# Patient Record
Sex: FEMALE | Race: WHITE | NOT HISPANIC OR LATINO | Employment: OTHER | URBAN - METROPOLITAN AREA
[De-identification: names, ages, dates, MRNs, and addresses within clinical notes are randomized per-mention and may not be internally consistent; named-entity substitution may affect disease eponyms.]

---

## 2017-01-09 DIAGNOSIS — E05.90 HYPERTHYROIDISM: ICD-10-CM

## 2017-01-09 DIAGNOSIS — E05.00 GRAVES DISEASE: Primary | ICD-10-CM

## 2017-01-10 ENCOUNTER — TELEPHONE (OUTPATIENT)
Dept: INTERNAL MEDICINE | Facility: CLINIC | Age: 57
End: 2017-01-10

## 2017-01-10 LAB
T4 FREE SERPL-MCNC: 1.01 NG/DL (ref 0.89–1.76)
TSH SERPL DL<=0.05 MIU/L-ACNC: 3.08 MIU/ML (ref 0.35–5.35)

## 2017-01-10 PROCEDURE — 84443 ASSAY THYROID STIM HORMONE: CPT | Performed by: INTERNAL MEDICINE

## 2017-01-10 PROCEDURE — 84439 ASSAY OF FREE THYROXINE: CPT | Performed by: INTERNAL MEDICINE

## 2017-01-10 PROCEDURE — 84481 FREE ASSAY (FT-3): CPT | Performed by: INTERNAL MEDICINE

## 2017-01-10 NOTE — TELEPHONE ENCOUNTER
----- Message from Karie Whitney MD sent at 1/9/2017  4:53 PM EST -----  Orders created.  Thanks, [facility]  ----- Message -----     From: Felicity Walsh MA     Sent: 1/9/2017  11:26 AM       To: Karie Whitney MD        ----- Message -----     From: Nolvia Mccollum     Sent: 1/9/2017   9:48 AM       To: Felicity Walsh MA    PT IS WANTING TO COME IN TOMORROW AND HAVE LABS DONE. PLEASE PLACE ORDER IN SYS

## 2017-01-11 LAB — T3FREE SERPL-MCNC: 3.3 PG/ML (ref 2–4.4)

## 2017-01-16 ENCOUNTER — TELEPHONE (OUTPATIENT)
Dept: INTERNAL MEDICINE | Facility: CLINIC | Age: 57
End: 2017-01-16

## 2017-01-16 NOTE — TELEPHONE ENCOUNTER
----- Message from Isa Montemayor sent at 1/16/2017  8:47 AM EST -----  THE PATIENT WAS CALLING IN REGARDS TO HER LAB WORK  AND WOULD LIKE FOR YOU TO GIVE HER A CALL SHE HAD SOME QUESTIONS CONCERNING THE INFORMATION THAT SHE HAS RECEIVED. THE PATIENT'S CALL BACK NUMBER [phone]

## 2017-01-16 NOTE — TELEPHONE ENCOUNTER
8 weeks is fine.  Thanks, [facility]      Called and spoke with pt, informed her of lab results and instructions per lab letters; instructing pt to reduce methimazole to 1 pill daily and recheck labs in 8 weeks, pt is scheduled to see Dr. Whitney tomorrow 01/17/17, pt was wondering if she has to come in tomorrow to be seen or can she come in and see Dr. Whitney in 8 weeks after she gets her labs rechecked again, Dr. Whitney pls advise?

## 2017-01-16 NOTE — TELEPHONE ENCOUNTER
Next Tuesday at 8:15 am.  Thanks, [facility]        Called and informed pt, per pt she can come in any time on Tuesdays, she can't come in on Thursday, any other day morning appts are best for her because her work starts at 9. Dr. Whitney you are completely booked up 8 weeks from now, pls advise on when we can work her in?

## 2017-01-17 NOTE — TELEPHONE ENCOUNTER
Yes that is good.  Thanks, [facility]          Dr. Whitney you wanted to see pt 8 weeks from now after she gets her labs done, can we use a Tuesday  8:15 appt slot 7-8 weeks from now?

## 2017-01-18 NOTE — TELEPHONE ENCOUNTER
Called and informed pt, per pt she can come in on 03/14/17 at 8:15 am, Tri can you pls schedule pt at that appt slot with Dr. Whitney, thanks!

## 2017-03-07 ENCOUNTER — TELEPHONE (OUTPATIENT)
Dept: ENDOCRINOLOGY | Facility: CLINIC | Age: 57
End: 2017-03-07

## 2017-03-07 DIAGNOSIS — E05.90 HYPERTHYROIDISM: Primary | ICD-10-CM

## 2017-03-07 LAB
T4 FREE SERPL-MCNC: 1.01 NG/DL (ref 0.89–1.76)
TSH SERPL DL<=0.05 MIU/L-ACNC: 3.62 MIU/ML (ref 0.35–5.35)

## 2017-03-07 PROCEDURE — 84443 ASSAY THYROID STIM HORMONE: CPT | Performed by: INTERNAL MEDICINE

## 2017-03-07 PROCEDURE — 84481 FREE ASSAY (FT-3): CPT | Performed by: INTERNAL MEDICINE

## 2017-03-07 PROCEDURE — 84439 ASSAY OF FREE THYROXINE: CPT | Performed by: INTERNAL MEDICINE

## 2017-03-08 LAB — T3FREE SERPL-MCNC: 2.9 PG/ML (ref 2–4.4)

## 2017-03-14 ENCOUNTER — OFFICE VISIT (OUTPATIENT)
Dept: ENDOCRINOLOGY | Facility: CLINIC | Age: 57
End: 2017-03-14

## 2017-03-14 VITALS
OXYGEN SATURATION: 98 % | DIASTOLIC BLOOD PRESSURE: 60 MMHG | WEIGHT: 140 LBS | HEIGHT: 66 IN | SYSTOLIC BLOOD PRESSURE: 118 MMHG | BODY MASS INDEX: 22.5 KG/M2 | HEART RATE: 78 BPM

## 2017-03-14 DIAGNOSIS — E05.90 HYPERTHYROIDISM: Primary | ICD-10-CM

## 2017-03-14 DIAGNOSIS — E05.00 GRAVES DISEASE: ICD-10-CM

## 2017-03-14 PROCEDURE — 99213 OFFICE O/P EST LOW 20 MIN: CPT | Performed by: INTERNAL MEDICINE

## 2017-03-14 RX ORDER — METHIMAZOLE 5 MG/1
5 TABLET ORAL DAILY
Qty: 30 TABLET | Refills: 3 | Status: SHIPPED | OUTPATIENT
Start: 2017-03-14 | End: 2017-03-14 | Stop reason: SDUPTHER

## 2017-03-14 RX ORDER — METHIMAZOLE 5 MG/1
TABLET ORAL
Qty: 30 TABLET | Refills: 3
Start: 2017-03-14 | End: 2017-05-22 | Stop reason: SDUPTHER

## 2017-03-14 RX ORDER — POLYETHYLENE GLYCOL 3350 17 G/17G
17 POWDER, FOR SOLUTION ORAL DAILY
COMMUNITY

## 2017-03-14 NOTE — PROGRESS NOTES
Falguni Alonzo 56 y.o.  CC:Follow-up and Hyperthyroidism    [place]: Follow-up and Hyperthyroidism  is doing well overall  No new c/o   Is taking 1 pill daily     Allergies   Allergen Reactions   • Erythromycin Hives and Rash       Current Outpatient Prescriptions:   •  Biotin 5000 MCG capsule, Take  by mouth., Disp: , Rfl:   •  Calcium Carbonate-Vitamin D (CALCIUM 500+D HIGH POTENCY) 500-400 MG-UNIT per tablet, Take 1 tablet by mouth., Disp: , Rfl:   •  Cholecalciferol (VITAMIN D) 1000 UNITS tablet, Take 1 tablet by mouth daily., Disp: , Rfl:   •  minoxidil (ROGAINE) 2 % external solution, Apply  topically 2 (two) times a day., Disp: , Rfl:   •  Multiple Vitamins-Minerals (CENTRUM PO), Take 1 tablet by mouth daily., Disp: , Rfl:   •  polyethylene glycol (MIRALAX) packet, Take 17 g by mouth Daily., Disp: , Rfl:   •  PREVIDENT 5000 SENSITIVE 1.1-5 % paste, , Disp: , Rfl:   •  docusate sodium (COLACE) 250 MG capsule, Take 250 mg by mouth 2 (two) times a day., Disp: , Rfl:   •  methimazole (TAPAZOLE) 5 MG tablet, TAKE 1 TABLET QOD ALTERNATING WITH 1 AND 1/2 TABLET QOD (Patient taking differently: 5 mg. Take one tablet daily), Disp: 45 tablet, Rfl: 5  Patient Active Problem List    Diagnosis   • WINDY (stress urinary incontinence, female) [N39.3]   • Rectocele [N81.6]   • Menopause [Z78.0]   • Graves disease [E05.00]   • Fibrocystic breast changes, bilateral [N60.11, N60.12]   • Family history of breast cancer in mother [Z80.3]   • Chronic constipation [K59.09]   • Anemia [D64.9]     Overview Note:     Impression: 09/01/2015 - repeat cbc; Description: childhood     • Loss of hair [L65.9]     Overview Note:     Impression: 09/01/2015 - likely due to stress related to thyroid  will continue to monitor;      • Hyperthyroidism [E05.90]     Overview Note:     Impression: 09/01/2015 - check tfts  check cbc, cmp  hives in interim on methimazole  single day of fever  discussed options for treatment  Impression: 06/22/2015 - check  tfts, tsi, thyroid antibodies  ddx of thyroid overactivity was discussed along with options for treatment   f/u 8-10 weeks   patient counselled about risk of thyroid overactivity, symptoms related to this  also discussed risks and benefits of each treatment option.;        Review of Systems   Constitutional: Negative for activity change, appetite change, chills, diaphoresis, fatigue, fever and unexpected weight change.   HENT: Negative for congestion, dental problem, drooling, ear discharge, ear pain, facial swelling, hearing loss, mouth sores, nosebleeds, postnasal drip, rhinorrhea, sinus pressure, sneezing, sore throat, tinnitus, trouble swallowing and voice change.    Eyes: Negative for photophobia, pain, discharge, redness, itching and visual disturbance.   Respiratory: Negative for apnea, cough, choking, chest tightness, shortness of breath, wheezing and stridor.    Cardiovascular: Negative for chest pain, palpitations and leg swelling.   Gastrointestinal: Negative for abdominal distention, abdominal pain, anal bleeding, blood in stool, constipation, diarrhea, nausea, rectal pain and vomiting.   Endocrine: Negative for cold intolerance, heat intolerance, polydipsia, polyphagia and polyuria.   Genitourinary: Negative for decreased urine volume, difficulty urinating, dysuria, enuresis, flank pain, frequency, genital sores, hematuria and urgency.   Musculoskeletal: Negative for arthralgias, back pain, gait problem, joint swelling, myalgias, neck pain and neck stiffness.   Skin: Negative for color change, pallor, rash and wound.   Allergic/Immunologic: Negative for environmental allergies, food allergies and immunocompromised state.   Neurological: Negative for dizziness, tremors, seizures, syncope, facial asymmetry, speech difficulty, weakness, light-headedness, numbness and headaches.   Hematological: Negative for adenopathy. Does not bruise/bleed easily.   Psychiatric/Behavioral: Negative for agitation,  "behavioral problems, confusion, decreased concentration, dysphoric mood, hallucinations, self-injury, sleep disturbance and suicidal ideas. The patient is not nervous/anxious and is not hyperactive.      Social History     Social History   • Marital status:      Spouse name: N/A   • Number of children: N/A   • Years of education: N/A     Occupational History   • Not on file.     Social History Main Topics   • Smoking status: Never Smoker   • Smokeless tobacco: Not on file   • Alcohol use No   • Drug use: No   • Sexual activity: Yes     Partners: Male     Birth control/ protection: Surgical, Post-menopausal     Other Topics Concern   • Not on file     Social History Narrative     Family History   Problem Relation Age of Onset   • Arrhythmia Mother    • Cancer Mother    • Heart attack Father    • Diabetes type II Father    • Thyroid disease Father      Visit Vitals   • /60   • Pulse 78   • Ht 66\" (167.6 cm)   • Wt 140 lb (63.5 kg)   • LMP  (LMP Unknown)   • SpO2 98%   • BMI 22.6 kg/m2     Physical Exam   Constitutional: She is oriented to person, place, and time. She appears well-developed and well-nourished.   HENT:   Head: Normocephalic and atraumatic.   Nose: Nose normal.   Mouth/Throat: Oropharynx is clear and moist.   Eyes: Conjunctivae, EOM and lids are normal. Pupils are equal, round, and reactive to light.   Neck: Trachea normal and normal range of motion. Neck supple. Carotid bruit is not present. No tracheal deviation present. No thyroid mass and no thyromegaly present.   Cardiovascular: Normal rate, regular rhythm, normal heart sounds and intact distal pulses.  Exam reveals no gallop and no friction rub.    No murmur heard.  Pulmonary/Chest: Effort normal and breath sounds normal. No respiratory distress. She has no wheezes.   Musculoskeletal: Normal range of motion. She exhibits no edema or deformity.   Lymphadenopathy:     She has no cervical adenopathy.   Neurological: She is alert and " oriented to person, place, and time. She has normal reflexes. She displays normal reflexes. No cranial nerve deficit.   Skin: Skin is warm and dry. No rash noted. No cyanosis or erythema. Nails show no clubbing.   Psychiatric: She has a normal mood and affect. Her speech is normal and behavior is normal. Judgment and thought content normal. Cognition and memory are normal.   Nursing note and vitals reviewed.    Results for orders placed or performed in visit on 03/07/17   T4, Free   Result Value Ref Range    Free T4 1.01 0.89 - 1.76 ng/dL   TSH   Result Value Ref Range    TSH 3.619 0.350 - 5.350 mIU/mL   T3, Free   Result Value Ref Range    T3, Free 2.9 2.0 - 4.4 pg/mL     Problem List Items Addressed This Visit        Endocrine    Hyperthyroidism - Primary     Reviewed tfts with her   Reduce tapazole to 2.5 mg daily   Recheck lab work in 8 weeks          Relevant Medications    TAPAZOLE 5 MG tablet    Graves disease     Stable          Relevant Medications    TAPAZOLE 5 MG tablet        Return in about 6 months (around 9/14/2017) for Recheck 30 min .    Felicity Walsh MA

## 2017-05-15 ENCOUNTER — TELEPHONE (OUTPATIENT)
Dept: INTERNAL MEDICINE | Facility: CLINIC | Age: 57
End: 2017-05-15

## 2017-05-15 DIAGNOSIS — E05.90 HYPERTHYROIDISM: Primary | ICD-10-CM

## 2017-05-16 ENCOUNTER — TELEPHONE (OUTPATIENT)
Dept: INTERNAL MEDICINE | Facility: CLINIC | Age: 57
End: 2017-05-16

## 2017-05-16 LAB
ALBUMIN SERPL-MCNC: 4.3 G/DL (ref 3.2–4.8)
ALBUMIN/GLOB SERPL: 1.9 G/DL (ref 1.5–2.5)
ALP SERPL-CCNC: 77 U/L (ref 25–100)
ALT SERPL W P-5'-P-CCNC: 24 U/L (ref 7–40)
ANION GAP SERPL CALCULATED.3IONS-SCNC: 3 MMOL/L (ref 3–11)
AST SERPL-CCNC: 29 U/L (ref 0–33)
BASOPHILS # BLD AUTO: 0.01 10*3/MM3 (ref 0–0.2)
BASOPHILS NFR BLD AUTO: 0.2 % (ref 0–1)
BILIRUB SERPL-MCNC: 0.4 MG/DL (ref 0.3–1.2)
BUN BLD-MCNC: 13 MG/DL (ref 9–23)
BUN/CREAT SERPL: 21.7 (ref 7–25)
CALCIUM SPEC-SCNC: 10.2 MG/DL (ref 8.7–10.4)
CHLORIDE SERPL-SCNC: 100 MMOL/L (ref 99–109)
CO2 SERPL-SCNC: 35 MMOL/L (ref 20–31)
CREAT BLD-MCNC: 0.6 MG/DL (ref 0.6–1.3)
DEPRECATED RDW RBC AUTO: 43 FL (ref 37–54)
EOSINOPHIL # BLD AUTO: 0.17 10*3/MM3 (ref 0.1–0.3)
EOSINOPHIL NFR BLD AUTO: 3.6 % (ref 0–3)
ERYTHROCYTE [DISTWIDTH] IN BLOOD BY AUTOMATED COUNT: 12.4 % (ref 11.3–14.5)
GFR SERPL CREATININE-BSD FRML MDRD: 103 ML/MIN/1.73
GLOBULIN UR ELPH-MCNC: 2.3 GM/DL
GLUCOSE BLD-MCNC: 100 MG/DL (ref 70–100)
HCT VFR BLD AUTO: 39.8 % (ref 34.5–44)
HGB BLD-MCNC: 13.3 G/DL (ref 11.5–15.5)
IMM GRANULOCYTES # BLD: 0.01 10*3/MM3 (ref 0–0.03)
IMM GRANULOCYTES NFR BLD: 0.2 % (ref 0–0.6)
LYMPHOCYTES # BLD AUTO: 1.88 10*3/MM3 (ref 0.6–4.8)
LYMPHOCYTES NFR BLD AUTO: 39.6 % (ref 24–44)
MCH RBC QN AUTO: 31.9 PG (ref 27–31)
MCHC RBC AUTO-ENTMCNC: 33.4 G/DL (ref 32–36)
MCV RBC AUTO: 95.4 FL (ref 80–99)
MONOCYTES # BLD AUTO: 0.55 10*3/MM3 (ref 0–1)
MONOCYTES NFR BLD AUTO: 11.6 % (ref 0–12)
NEUTROPHILS # BLD AUTO: 2.13 10*3/MM3 (ref 1.5–8.3)
NEUTROPHILS NFR BLD AUTO: 44.8 % (ref 41–71)
PLATELET # BLD AUTO: 246 10*3/MM3 (ref 150–450)
PMV BLD AUTO: 9.7 FL (ref 6–12)
POTASSIUM BLD-SCNC: 4.1 MMOL/L (ref 3.5–5.5)
PROT SERPL-MCNC: 6.6 G/DL (ref 5.7–8.2)
RBC # BLD AUTO: 4.17 10*6/MM3 (ref 3.89–5.14)
SODIUM BLD-SCNC: 138 MMOL/L (ref 132–146)
T4 FREE SERPL-MCNC: 1.04 NG/DL (ref 0.89–1.76)
TSH SERPL DL<=0.05 MIU/L-ACNC: 3.29 MIU/ML (ref 0.35–5.35)
WBC NRBC COR # BLD: 4.75 10*3/MM3 (ref 3.5–10.8)

## 2017-05-16 PROCEDURE — 85025 COMPLETE CBC W/AUTO DIFF WBC: CPT | Performed by: INTERNAL MEDICINE

## 2017-05-16 PROCEDURE — 84439 ASSAY OF FREE THYROXINE: CPT | Performed by: INTERNAL MEDICINE

## 2017-05-16 PROCEDURE — 84481 FREE ASSAY (FT-3): CPT | Performed by: INTERNAL MEDICINE

## 2017-05-16 PROCEDURE — 84443 ASSAY THYROID STIM HORMONE: CPT | Performed by: INTERNAL MEDICINE

## 2017-05-16 PROCEDURE — 80053 COMPREHEN METABOLIC PANEL: CPT | Performed by: INTERNAL MEDICINE

## 2017-05-17 LAB — T3FREE SERPL-MCNC: 2.9 PG/ML (ref 2–4.4)

## 2017-05-22 ENCOUNTER — TELEPHONE (OUTPATIENT)
Dept: INTERNAL MEDICINE | Facility: CLINIC | Age: 57
End: 2017-05-22

## 2017-05-22 RX ORDER — METHIMAZOLE 5 MG/1
TABLET ORAL
Qty: 30 TABLET | Refills: 3
Start: 2017-05-22 | End: 2018-08-14 | Stop reason: SDUPTHER

## 2017-07-24 ENCOUNTER — TELEPHONE (OUTPATIENT)
Dept: INTERNAL MEDICINE | Facility: CLINIC | Age: 57
End: 2017-07-24

## 2017-07-24 ENCOUNTER — TELEPHONE (OUTPATIENT)
Dept: ENDOCRINOLOGY | Facility: CLINIC | Age: 57
End: 2017-07-24

## 2017-07-24 DIAGNOSIS — E05.00 GRAVES' DISEASE: Primary | ICD-10-CM

## 2017-07-24 NOTE — TELEPHONE ENCOUNTER
Patient advised lab order has been created and she can come in tomorrow to have labs drawn  Pt voiced understanding

## 2017-07-24 NOTE — TELEPHONE ENCOUNTER
MS NIVIA CALLED REQUESTING LAB ORDERS FOR THYROID, SHE STATES THAT SHE WILL BE OFF WORK ON TUES 7/25 AND WOULD LIKE TO COME IN TO HAVE THESE DRAWN.

## 2017-07-25 LAB
ALBUMIN SERPL-MCNC: 4.2 G/DL (ref 3.2–4.8)
ALBUMIN/GLOB SERPL: 1.9 G/DL (ref 1.5–2.5)
ALP SERPL-CCNC: 77 U/L (ref 25–100)
ALT SERPL W P-5'-P-CCNC: 24 U/L (ref 7–40)
ANION GAP SERPL CALCULATED.3IONS-SCNC: 4 MMOL/L (ref 3–11)
AST SERPL-CCNC: 25 U/L (ref 0–33)
BASOPHILS # BLD AUTO: 0.02 10*3/MM3 (ref 0–0.2)
BASOPHILS NFR BLD AUTO: 0.4 % (ref 0–1)
BILIRUB SERPL-MCNC: 0.4 MG/DL (ref 0.3–1.2)
BUN BLD-MCNC: 17 MG/DL (ref 9–23)
BUN/CREAT SERPL: 24.3 (ref 7–25)
CALCIUM SPEC-SCNC: 9.8 MG/DL (ref 8.7–10.4)
CHLORIDE SERPL-SCNC: 103 MMOL/L (ref 99–109)
CO2 SERPL-SCNC: 31 MMOL/L (ref 20–31)
CREAT BLD-MCNC: 0.7 MG/DL (ref 0.6–1.3)
DEPRECATED RDW RBC AUTO: 42.2 FL (ref 37–54)
EOSINOPHIL # BLD AUTO: 0.13 10*3/MM3 (ref 0–0.3)
EOSINOPHIL NFR BLD AUTO: 2.9 % (ref 0–3)
ERYTHROCYTE [DISTWIDTH] IN BLOOD BY AUTOMATED COUNT: 12.3 % (ref 11.3–14.5)
GFR SERPL CREATININE-BSD FRML MDRD: 86 ML/MIN/1.73
GLOBULIN UR ELPH-MCNC: 2.2 GM/DL
GLUCOSE BLD-MCNC: 96 MG/DL (ref 70–100)
HCT VFR BLD AUTO: 38.6 % (ref 34.5–44)
HGB BLD-MCNC: 12.8 G/DL (ref 11.5–15.5)
IMM GRANULOCYTES # BLD: 0 10*3/MM3 (ref 0–0.03)
IMM GRANULOCYTES NFR BLD: 0 % (ref 0–0.6)
LYMPHOCYTES # BLD AUTO: 1.77 10*3/MM3 (ref 0.6–4.8)
LYMPHOCYTES NFR BLD AUTO: 39 % (ref 24–44)
MCH RBC QN AUTO: 31.1 PG (ref 27–31)
MCHC RBC AUTO-ENTMCNC: 33.2 G/DL (ref 32–36)
MCV RBC AUTO: 93.7 FL (ref 80–99)
MONOCYTES # BLD AUTO: 0.51 10*3/MM3 (ref 0–1)
MONOCYTES NFR BLD AUTO: 11.2 % (ref 0–12)
NEUTROPHILS # BLD AUTO: 2.11 10*3/MM3 (ref 1.5–8.3)
NEUTROPHILS NFR BLD AUTO: 46.5 % (ref 41–71)
PLATELET # BLD AUTO: 261 10*3/MM3 (ref 150–450)
PMV BLD AUTO: 9.6 FL (ref 6–12)
POTASSIUM BLD-SCNC: 4.2 MMOL/L (ref 3.5–5.5)
PROT SERPL-MCNC: 6.4 G/DL (ref 5.7–8.2)
RBC # BLD AUTO: 4.12 10*6/MM3 (ref 3.89–5.14)
SODIUM BLD-SCNC: 138 MMOL/L (ref 132–146)
T4 FREE SERPL-MCNC: 1.01 NG/DL (ref 0.89–1.76)
TSH SERPL DL<=0.05 MIU/L-ACNC: 2.02 MIU/ML (ref 0.35–5.35)
WBC NRBC COR # BLD: 4.54 10*3/MM3 (ref 3.5–10.8)

## 2017-07-25 PROCEDURE — 84439 ASSAY OF FREE THYROXINE: CPT | Performed by: INTERNAL MEDICINE

## 2017-07-25 PROCEDURE — 85025 COMPLETE CBC W/AUTO DIFF WBC: CPT | Performed by: INTERNAL MEDICINE

## 2017-07-25 PROCEDURE — 80053 COMPREHEN METABOLIC PANEL: CPT | Performed by: INTERNAL MEDICINE

## 2017-07-25 PROCEDURE — 84443 ASSAY THYROID STIM HORMONE: CPT | Performed by: INTERNAL MEDICINE

## 2017-07-31 ENCOUNTER — TELEPHONE (OUTPATIENT)
Dept: INTERNAL MEDICINE | Facility: CLINIC | Age: 57
End: 2017-07-31

## 2017-07-31 NOTE — TELEPHONE ENCOUNTER
PATIENT RECEIVED HER LAB WORK IN THE MAIL AND SHE HAS A COUPLE QUESTIONS ON WHAT SHE RECEIVED AND WOULD LIKE TO GET A CALL BACK -[phone]

## 2017-07-31 NOTE — TELEPHONE ENCOUNTER
Patient is asking if a free T3 was done on her labs   She was advised it was not recently done and if she wants it done next time to remind us when she calls for the order to be created  She voiced understanding and will remind us next time

## 2017-09-18 ENCOUNTER — TELEPHONE (OUTPATIENT)
Dept: INTERNAL MEDICINE | Facility: CLINIC | Age: 57
End: 2017-09-18

## 2017-09-18 NOTE — TELEPHONE ENCOUNTER
Patient was advised she does need an OV every 6 months for Dr Whitney to continue to monitor and order labs for her thyroid conditions  Pt voiced understanding

## 2017-09-18 NOTE — TELEPHONE ENCOUNTER
PATIENT HAS AN APPOINTMENT COMING UP FOR A 6 MONTH FOLLOW UP. SHE STATES SHE RECENTLY HAD BLOOD WORK AND IS NOT SURE IF SHE REALLY NEEDS THIS APPOINTMENT. SHE WOULD LIKE A CALL BACK ON IF SHE TRULY NEEDS TO COME IN FOR THIS APPOINTMENT OR IS THIS SOMETHING SHE CAN CANCEL. YOU CAN REACH HER BACK AT [phone]

## 2017-10-08 RX ORDER — METHIMAZOLE 5 MG/1
TABLET ORAL
Qty: 30 TABLET | Refills: 0 | Status: SHIPPED | OUTPATIENT
Start: 2017-10-08 | End: 2017-11-06 | Stop reason: SDUPTHER

## 2017-10-30 ENCOUNTER — TELEPHONE (OUTPATIENT)
Dept: INTERNAL MEDICINE | Facility: CLINIC | Age: 57
End: 2017-10-30

## 2017-10-30 DIAGNOSIS — E05.90 HYPERTHYROIDISM: Primary | ICD-10-CM

## 2017-10-30 NOTE — TELEPHONE ENCOUNTER
Order created.  Thanks, [facility]      Please create lab order

## 2017-10-30 NOTE — TELEPHONE ENCOUNTER
PHOEBE,    MS NIVIA CALLED TO REQUEST T3, T4 AND  TSH LAB ORDERS. SHE STATES SHE SHOULD HAVE HAD THIS DONE SEVERAL MONTHS AGO BUT MISSED GETTING THESE DRAWN DUE TO BIRTH OF GRANDCHILD AND CARING FOR A PARENT AFTER A FALL. SHE WOULD LIKE TO BE ABLE TO COME IN THURS MORNING.

## 2017-11-01 LAB
T4 FREE SERPL-MCNC: 1.08 NG/DL (ref 0.89–1.76)
TSH SERPL DL<=0.05 MIU/L-ACNC: 2.65 MIU/ML (ref 0.35–5.35)

## 2017-11-01 PROCEDURE — 84439 ASSAY OF FREE THYROXINE: CPT | Performed by: INTERNAL MEDICINE

## 2017-11-01 PROCEDURE — 84443 ASSAY THYROID STIM HORMONE: CPT | Performed by: INTERNAL MEDICINE

## 2017-11-01 PROCEDURE — 84481 FREE ASSAY (FT-3): CPT | Performed by: INTERNAL MEDICINE

## 2017-11-02 LAB — T3FREE SERPL-MCNC: 3.3 PG/ML (ref 2–4.4)

## 2017-11-06 RX ORDER — METHIMAZOLE 5 MG/1
TABLET ORAL
Qty: 30 TABLET | Refills: 0 | Status: SHIPPED | OUTPATIENT
Start: 2017-11-06 | End: 2018-01-09 | Stop reason: SDUPTHER

## 2017-11-28 ENCOUNTER — OFFICE VISIT (OUTPATIENT)
Dept: OBSTETRICS AND GYNECOLOGY | Facility: CLINIC | Age: 57
End: 2017-11-28

## 2017-11-28 VITALS
SYSTOLIC BLOOD PRESSURE: 120 MMHG | HEIGHT: 66 IN | BODY MASS INDEX: 22.56 KG/M2 | DIASTOLIC BLOOD PRESSURE: 70 MMHG | WEIGHT: 140.4 LBS

## 2017-11-28 DIAGNOSIS — N60.12 FIBROCYSTIC BREAST CHANGES, BILATERAL: ICD-10-CM

## 2017-11-28 DIAGNOSIS — Z80.3 FAMILY HISTORY OF BREAST CANCER IN MOTHER: ICD-10-CM

## 2017-11-28 DIAGNOSIS — Z78.0 MENOPAUSE: Primary | ICD-10-CM

## 2017-11-28 DIAGNOSIS — N60.11 FIBROCYSTIC BREAST CHANGES, BILATERAL: ICD-10-CM

## 2017-11-28 PROCEDURE — 99396 PREV VISIT EST AGE 40-64: CPT | Performed by: OBSTETRICS & GYNECOLOGY

## 2017-11-28 NOTE — PROGRESS NOTES
Chief Complaint   Patient presents with   • Gynecologic Exam       Falguni Alonzo is a 57 y.o. year old  presenting to be seen for her annual exam.This patient has previously had an LAVH and an anterior-posterior repair.  She has occasional stress incontinence although it is not a constant problem.  She has known, stable, fibrocystic changes of the breast.  She has occasional hot flashes although she does not desire treatment.  Her mother had breast cancer.    SCREENING TESTS    Year 2012   Age                         PAP     Neg.                    HPV high risk                         Mammogram     benign benign                   NANCY score                         Breast MRI                         Lipids                         Vitamin D                         Colonoscopy                         DEXA  Frax (hip/any)                         Ovarian Screen                             She exercises regularly: yes.  She wears her seat belt: yes.  She has concerns about domestic violence: no.  She has noticed changes in height: no    GYN screening history:  · Last pap: was done on approximately 2016 and the result was: normal PAP.  · Last mammogram: was done on approximately 2017 and the result was: Birads II (Benign findings)..    No Additional Complaints Reported    The following portions of the patient's history were reviewed and updated as appropriate:vital signs and   She  does not have any pertinent problems on file.  She  has a past surgical history that includes Tonsillectomy; Tonsillectomy; Tubal ligation; and laparoscopic assisted vaginal hysterectomy, anterior and posterior vaginal repair.  Her family history includes Arrhythmia in her mother; Cancer in her mother; Diabetes type II in her father; Heart attack in her father; Thyroid disease in her father.  She  reports that she has  "never smoked. She has never used smokeless tobacco. She reports that she does not drink alcohol or use illicit drugs.  Current Outpatient Prescriptions   Medication Sig Dispense Refill   • Biotin 5000 MCG capsule Take  by mouth.     • Calcium Carbonate-Vitamin D (CALCIUM 500+D HIGH POTENCY) 500-400 MG-UNIT per tablet Take 1 tablet by mouth.     • Cholecalciferol (VITAMIN D) 1000 UNITS tablet Take 1 tablet by mouth daily.     • docusate sodium (COLACE) 250 MG capsule Take 250 mg by mouth 2 (two) times a day.     • minoxidil (ROGAINE) 2 % external solution Apply  topically 2 (two) times a day.     • Multiple Vitamins-Minerals (CENTRUM PO) Take 1 tablet by mouth daily.     • polyethylene glycol (MIRALAX) packet Take 17 g by mouth Daily.     • TAPAZOLE 5 MG tablet Take 1/2 pill daily a/w 3/4 pill QOD 30 tablet 3   • PREVIDENT 5000 SENSITIVE 1.1-5 % paste      • TAPAZOLE 5 MG tablet TAKE 1\2 TABLET ONE DAY AND ALTERNATE WITH 3\4 TABLET NEXT DAY 30 tablet 0     No current facility-administered medications for this visit.      She is allergic to erythromycin..    Review of Systems  A comprehensive review of systems was taken.  Constitutional: negative for fever, chills, activity change, appetite change, fatigue and unexpected weight change.  Respiratory: negative  Cardiovascular: negative  Gastrointestinal: negative  Genitourinary:occasional WINDY  Musculoskeletal:negative  Behavioral/Psych: negative       /70  Ht 66\" (167.6 cm)  Wt 140 lb 6.4 oz (63.7 kg)  LMP  (LMP Unknown)  BMI 22.66 kg/m2    Physical Exam    General:  alert; cooperative; well developed; well nourished   Skin:  No suspicious lesions seen   Thyroid: normal to inspection and palpation   Lungs:  clear to auscultation bilaterally   Heart:  regular rate and rhythm, S1, S2 normal, no murmur, click, rub or gallop   Breasts:  Examined in supine position  Symmetric without masses or skin dimpling  Nipples normal without inversion, lesions or " discharge  There are no palpable axillary nodes  Fibrocystic changes are present both breasts without a discrete mass   Abdomen: soft, non-tender; no masses  no umbilical or inginual hernias are present  no hepato-splenomegaly   Pelvis: Clinical staff was present for exam  External genitalia:  normal appearance of the external genitalia including Bartholin's and [place]'s glands.  Vaginal:  normal pink mucosa without prolapse or lesions.  Cervix:  absent.  Uterus:  absent.  Adnexa:  non palpable bilaterally.  Rectal:  anus visually normal appearing. recto-vaginal exam unremarkable and confirms findings;     Lab Review   No data reviewed    Imaging  Mammogram results- benign         ASSESSMENT  Problems Addressed this Visit        Genitourinary    Menopause - Primary       Other    Fibrocystic breast changes, bilateral    Family history of breast cancer in mother          PLAN    · Medications prescribed this encounter:  No orders of the defined types were placed in this encounter.  · Monthly self breast assessment and annual breast imaging  · Calcium, 600 mg/ Vit. D, 400 IU daily; regular weight-bearing exercise  · Follow up: 12 month(s)  *Please note that portions of this documentation may have been completed with a voice recognition program.  Efforts were made to edit this dictation, but occasional words may have been mistranscribed.       This note was electronically signed.    CHEY Hayward MD  November 28, 2017  11:21 AM

## 2018-01-06 ENCOUNTER — OFFICE VISIT (OUTPATIENT)
Dept: INTERNAL MEDICINE | Facility: CLINIC | Age: 58
End: 2018-01-06

## 2018-01-06 DIAGNOSIS — D64.9 ANEMIA, UNSPECIFIED TYPE: ICD-10-CM

## 2018-01-06 DIAGNOSIS — E05.90 HYPERTHYROIDISM: ICD-10-CM

## 2018-01-06 PROCEDURE — 84439 ASSAY OF FREE THYROXINE: CPT | Performed by: INTERNAL MEDICINE

## 2018-01-06 PROCEDURE — 84443 ASSAY THYROID STIM HORMONE: CPT | Performed by: INTERNAL MEDICINE

## 2018-01-06 PROCEDURE — 80053 COMPREHEN METABOLIC PANEL: CPT | Performed by: INTERNAL MEDICINE

## 2018-01-06 PROCEDURE — 36415 COLL VENOUS BLD VENIPUNCTURE: CPT | Performed by: INTERNAL MEDICINE

## 2018-01-06 PROCEDURE — 85025 COMPLETE CBC W/AUTO DIFF WBC: CPT | Performed by: INTERNAL MEDICINE

## 2018-01-09 ENCOUNTER — OFFICE VISIT (OUTPATIENT)
Dept: ENDOCRINOLOGY | Facility: CLINIC | Age: 58
End: 2018-01-09

## 2018-01-09 VITALS
DIASTOLIC BLOOD PRESSURE: 62 MMHG | HEART RATE: 73 BPM | WEIGHT: 142 LBS | SYSTOLIC BLOOD PRESSURE: 120 MMHG | BODY MASS INDEX: 22.92 KG/M2 | OXYGEN SATURATION: 98 %

## 2018-01-09 DIAGNOSIS — E05.90 HYPERTHYROIDISM: Primary | ICD-10-CM

## 2018-01-09 DIAGNOSIS — D64.9 ANEMIA, UNSPECIFIED TYPE: ICD-10-CM

## 2018-01-09 PROCEDURE — 99213 OFFICE O/P EST LOW 20 MIN: CPT | Performed by: INTERNAL MEDICINE

## 2018-01-09 RX ORDER — METHIMAZOLE 5 MG/1
5 TABLET ORAL DAILY
Qty: 30 TABLET | Refills: 11 | Status: SHIPPED | OUTPATIENT
Start: 2018-01-09 | End: 2018-08-14 | Stop reason: SDUPTHER

## 2018-01-09 NOTE — PROGRESS NOTES
Falguni Alonzo 57 y.o.  CC: Follow-up (Hyperthyroidism)    [place]: Follow-up (Hyperthyroidism)    Reviewed recent lab work   Results for orders placed or performed in visit on 01/04/18   TSH   Result Value Ref Range    TSH 2.106 0.350 - 5.350 mIU/mL   T4, Free   Result Value Ref Range    Free T4 1.08 0.89 - 1.76 ng/dL   Comprehensive Metabolic Panel   Result Value Ref Range    Glucose 106 (H) 70 - 100 mg/dL    BUN 14 9 - 23 mg/dL    Creatinine 0.60 0.60 - 1.30 mg/dL    Sodium 139 132 - 146 mmol/L    Potassium 4.2 3.5 - 5.5 mmol/L    Chloride 100 99 - 109 mmol/L    CO2 33.0 (H) 20.0 - 31.0 mmol/L    Calcium 9.5 8.7 - 10.4 mg/dL    Total Protein 6.6 5.7 - 8.2 g/dL    Albumin 4.40 3.20 - 4.80 g/dL    ALT (SGPT) 26 7 - 40 U/L    AST (SGOT) 29 0 - 33 U/L    Alkaline Phosphatase 82 25 - 100 U/L    Total Bilirubin 0.4 0.3 - 1.2 mg/dL    eGFR Non African Amer 103 >60 mL/min/1.73    Globulin 2.2 gm/dL    A/G Ratio 2.0 1.5 - 2.5 g/dL    BUN/Creatinine Ratio 23.3 7.0 - 25.0    Anion Gap 6.0 3.0 - 11.0 mmol/L   CBC Auto Differential   Result Value Ref Range    WBC 4.63 3.50 - 10.80 10*3/mm3    RBC 4.24 3.89 - 5.14 10*6/mm3    Hemoglobin 13.5 11.5 - 15.5 g/dL    Hematocrit 40.6 34.5 - 44.0 %    MCV 95.8 80.0 - 99.0 fL    MCH 31.8 (H) 27.0 - 31.0 pg    MCHC 33.3 32.0 - 36.0 g/dL    RDW 12.5 11.3 - 14.5 %    RDW-SD 43.8 37.0 - 54.0 fl    MPV 9.8 6.0 - 12.0 fL    Platelets 265 150 - 450 10*3/mm3    Neutrophil % 49.5 41.0 - 71.0 %    Lymphocyte % 40.6 24.0 - 44.0 %    Monocyte % 7.6 0.0 - 12.0 %    Eosinophil % 1.9 0.0 - 3.0 %    Basophil % 0.4 0.0 - 1.0 %    Immature Grans % 0.0 0.0 - 0.6 %    Neutrophils, Absolute 2.29 1.50 - 8.30 10*3/mm3    Lymphocytes, Absolute 1.88 0.60 - 4.80 10*3/mm3    Monocytes, Absolute 0.35 0.00 - 1.00 10*3/mm3    Eosinophils, Absolute 0.09 0.00 - 0.30 10*3/mm3    Basophils, Absolute 0.02 0.00 - 0.20 10*3/mm3    Immature Grans, Absolute 0.00 0.00 - 0.03 10*3/mm3     TSH 2.1, down from 2.4  Other tfts are  stable   She is on 1/2 a/w 3/4 pill every other day   Some palpitations - retested because of this   Mild tremors in am     Allergies   Allergen Reactions   • Erythromycin Hives and Rash       Current Outpatient Prescriptions:   •  Biotin 5000 MCG capsule, Take  by mouth., Disp: , Rfl:   •  Calcium Carbonate-Vitamin D (CALCIUM 500+D HIGH POTENCY) 500-400 MG-UNIT per tablet, Take 1 tablet by mouth., Disp: , Rfl:   •  Cholecalciferol (VITAMIN D) 1000 UNITS tablet, Take 1 tablet by mouth daily., Disp: , Rfl:   •  docusate sodium (COLACE) 250 MG capsule, Take 250 mg by mouth 2 (two) times a day., Disp: , Rfl:   •  minoxidil (ROGAINE) 2 % external solution, Apply  topically 2 (two) times a day., Disp: , Rfl:   •  Multiple Vitamins-Minerals (CENTRUM PO), Take 1 tablet by mouth daily., Disp: , Rfl:   •  polyethylene glycol (MIRALAX) packet, Take 17 g by mouth Daily., Disp: , Rfl:   •  TAPAZOLE 5 MG tablet, Take 1/2 pill daily a/w 3/4 pill QOD, Disp: 30 tablet, Rfl: 3  •  TAPAZOLE 5 MG tablet, TAKE 1\2 TABLET ONE DAY AND ALTERNATE WITH 3\4 TABLET NEXT DAY, Disp: 30 tablet, Rfl: 0  Patient Active Problem List    Diagnosis   • WINDY (stress urinary incontinence, female) [N39.3]   • Rectocele [N81.6]   • Menopause [Z78.0]   • Graves disease [E05.00]   • Fibrocystic breast changes, bilateral [N60.11, N60.12]   • Family history of breast cancer in mother [Z80.3]   • Chronic constipation [K59.09]   • Anemia [D64.9]     Overview Note:     Impression: 09/01/2015 - repeat cbc; Description: childhood     • Loss of hair [L65.9]     Overview Note:     Impression: 09/01/2015 - likely due to stress related to thyroid  will continue to monitor;      • Hyperthyroidism [E05.90]     Overview Note:     Impression: 09/01/2015 - check tfts  check cbc, cmp  hives in interim on methimazole  single day of fever  discussed options for treatment  Impression: 06/22/2015 - check tfts, tsi, thyroid antibodies  ddx of thyroid overactivity was discussed  along with options for treatment   f/u 8-10 weeks   patient counselled about risk of thyroid overactivity, symptoms related to this  also discussed risks and benefits of each treatment option.;        Review of Systems   Constitutional: Negative for activity change, appetite change, chills, diaphoresis, fatigue, fever and unexpected weight change.   HENT: Negative for congestion, dental problem, drooling, ear discharge, ear pain, facial swelling, hearing loss, mouth sores, nosebleeds, postnasal drip, rhinorrhea, sinus pressure, sneezing, sore throat, tinnitus, trouble swallowing and voice change.    Eyes: Negative for photophobia, pain, discharge, redness, itching and visual disturbance.   Respiratory: Negative for apnea, cough, choking, chest tightness, shortness of breath, wheezing and stridor.    Cardiovascular: Negative for chest pain, palpitations and leg swelling.   Gastrointestinal: Negative for abdominal distention, abdominal pain, anal bleeding, blood in stool, constipation, diarrhea, nausea, rectal pain and vomiting.   Endocrine: Negative for cold intolerance, heat intolerance, polydipsia, polyphagia and polyuria.   Genitourinary: Negative for decreased urine volume, difficulty urinating, dysuria, enuresis, flank pain, frequency, genital sores, hematuria and urgency.   Musculoskeletal: Negative for arthralgias, back pain, gait problem, joint swelling, myalgias, neck pain and neck stiffness.   Skin: Negative for color change, pallor, rash and wound.   Allergic/Immunologic: Negative for environmental allergies, food allergies and immunocompromised state.   Neurological: Negative for dizziness, tremors, seizures, syncope, facial asymmetry, speech difficulty, weakness, light-headedness, numbness and headaches.   Hematological: Negative for adenopathy. Does not bruise/bleed easily.   Psychiatric/Behavioral: Negative for agitation, behavioral problems, confusion, decreased concentration, dysphoric mood,  hallucinations, self-injury, sleep disturbance and suicidal ideas. The patient is not nervous/anxious and is not hyperactive.      Social History     Social History   • Marital status:      Spouse name: N/A   • Number of children: N/A   • Years of education: N/A     Occupational History   • Not on file.     Social History Main Topics   • Smoking status: Never Smoker   • Smokeless tobacco: Never Used   • Alcohol use No   • Drug use: No   • Sexual activity: Yes     Partners: Male     Birth control/ protection: Surgical, Post-menopausal     Other Topics Concern   • Not on file     Social History Narrative     Family History   Problem Relation Age of Onset   • Arrhythmia Mother    • Cancer Mother    • Heart attack Father    • Diabetes type II Father    • Thyroid disease Father      /62  Pulse 73  Wt 64.4 kg (142 lb)  LMP  (LMP Unknown)  SpO2 98%  BMI 22.92 kg/m2  Physical Exam   Constitutional: She is oriented to person, place, and time. She appears well-developed and well-nourished.   HENT:   Head: Normocephalic and atraumatic.   Nose: Nose normal.   Mouth/Throat: Oropharynx is clear and moist.   Eyes: Conjunctivae, EOM and lids are normal. Pupils are equal, round, and reactive to light.   Neck: Trachea normal and normal range of motion. Neck supple. Carotid bruit is not present. No tracheal deviation present. No thyroid mass and no thyromegaly (small goiter ) present.   Cardiovascular: Normal rate, regular rhythm, normal heart sounds and intact distal pulses.  Exam reveals no gallop and no friction rub.    No murmur heard.  Pulmonary/Chest: Effort normal and breath sounds normal. No respiratory distress. She has no wheezes.   Musculoskeletal: Normal range of motion. She exhibits no edema or deformity.   Lymphadenopathy:     She has no cervical adenopathy.   Neurological: She is alert and oriented to person, place, and time. She has normal reflexes. She displays normal reflexes. No cranial nerve  deficit.   Skin: Skin is warm and dry. No rash noted. No cyanosis or erythema. Nails show no clubbing.   Psychiatric: She has a normal mood and affect. Her speech is normal and behavior is normal. Judgment and thought content normal. Cognition and memory are normal.   Nursing note and vitals reviewed.    Results for orders placed or performed in visit on 01/04/18   TSH   Result Value Ref Range    TSH 2.106 0.350 - 5.350 mIU/mL   T4, Free   Result Value Ref Range    Free T4 1.08 0.89 - 1.76 ng/dL   Comprehensive Metabolic Panel   Result Value Ref Range    Glucose 106 (H) 70 - 100 mg/dL    BUN 14 9 - 23 mg/dL    Creatinine 0.60 0.60 - 1.30 mg/dL    Sodium 139 132 - 146 mmol/L    Potassium 4.2 3.5 - 5.5 mmol/L    Chloride 100 99 - 109 mmol/L    CO2 33.0 (H) 20.0 - 31.0 mmol/L    Calcium 9.5 8.7 - 10.4 mg/dL    Total Protein 6.6 5.7 - 8.2 g/dL    Albumin 4.40 3.20 - 4.80 g/dL    ALT (SGPT) 26 7 - 40 U/L    AST (SGOT) 29 0 - 33 U/L    Alkaline Phosphatase 82 25 - 100 U/L    Total Bilirubin 0.4 0.3 - 1.2 mg/dL    eGFR Non African Amer 103 >60 mL/min/1.73    Globulin 2.2 gm/dL    A/G Ratio 2.0 1.5 - 2.5 g/dL    BUN/Creatinine Ratio 23.3 7.0 - 25.0    Anion Gap 6.0 3.0 - 11.0 mmol/L   CBC Auto Differential   Result Value Ref Range    WBC 4.63 3.50 - 10.80 10*3/mm3    RBC 4.24 3.89 - 5.14 10*6/mm3    Hemoglobin 13.5 11.5 - 15.5 g/dL    Hematocrit 40.6 34.5 - 44.0 %    MCV 95.8 80.0 - 99.0 fL    MCH 31.8 (H) 27.0 - 31.0 pg    MCHC 33.3 32.0 - 36.0 g/dL    RDW 12.5 11.3 - 14.5 %    RDW-SD 43.8 37.0 - 54.0 fl    MPV 9.8 6.0 - 12.0 fL    Platelets 265 150 - 450 10*3/mm3    Neutrophil % 49.5 41.0 - 71.0 %    Lymphocyte % 40.6 24.0 - 44.0 %    Monocyte % 7.6 0.0 - 12.0 %    Eosinophil % 1.9 0.0 - 3.0 %    Basophil % 0.4 0.0 - 1.0 %    Immature Grans % 0.0 0.0 - 0.6 %    Neutrophils, Absolute 2.29 1.50 - 8.30 10*3/mm3    Lymphocytes, Absolute 1.88 0.60 - 4.80 10*3/mm3    Monocytes, Absolute 0.35 0.00 - 1.00 10*3/mm3    Eosinophils,  Absolute 0.09 0.00 - 0.30 10*3/mm3    Basophils, Absolute 0.02 0.00 - 0.20 10*3/mm3    Immature Grans, Absolute 0.00 0.00 - 0.03 10*3/mm3     Problem List Items Addressed This Visit        Endocrine    Hyperthyroidism - Primary     tsh stable- refill sent          Relevant Medications    TAPAZOLE 5 MG tablet       Hematopoietic and Hemostatic    Anemia     Cbc normal              Return in about 6 months (around 7/9/2018) for Recheck 30 min .    Michelle Carroll MA  Signed Karie Whitney MD

## 2018-08-01 ENCOUNTER — TELEPHONE (OUTPATIENT)
Dept: INTERNAL MEDICINE | Facility: CLINIC | Age: 58
End: 2018-08-01

## 2018-08-01 DIAGNOSIS — E05.90 HYPERTHYROIDISM: Primary | ICD-10-CM

## 2018-08-01 NOTE — TELEPHONE ENCOUNTER
PT HAS AN APPOINTMENT SCHEDULED ON 08/14 AND IS REQUESTING TO HAVE HER LABS DRAWN PRIOR TO THE APPOINTMENT.  THERE ARE CURRENTLY NO LABS ORDERED IN [facility].  CAN LABS BE ORDERED FOR HER?  I HAVE ADVISED PATIENT TO CALL BEFORE COMING IN.    THANKS

## 2018-08-02 LAB
ALBUMIN SERPL-MCNC: 4.3 G/DL (ref 3.2–4.8)
ALBUMIN/GLOB SERPL: 2 G/DL (ref 1.5–2.5)
ALP SERPL-CCNC: 72 U/L (ref 25–100)
ALT SERPL W P-5'-P-CCNC: 30 U/L (ref 7–40)
ANION GAP SERPL CALCULATED.3IONS-SCNC: 9 MMOL/L (ref 3–11)
AST SERPL-CCNC: 31 U/L (ref 0–33)
BASOPHILS # BLD AUTO: 0.02 10*3/MM3 (ref 0–0.2)
BASOPHILS NFR BLD AUTO: 0.5 % (ref 0–1)
BILIRUB SERPL-MCNC: 0.4 MG/DL (ref 0.3–1.2)
BUN BLD-MCNC: 18 MG/DL (ref 9–23)
BUN/CREAT SERPL: 29 (ref 7–25)
CALCIUM SPEC-SCNC: 9.4 MG/DL (ref 8.7–10.4)
CHLORIDE SERPL-SCNC: 104 MMOL/L (ref 99–109)
CO2 SERPL-SCNC: 31 MMOL/L (ref 20–31)
CREAT BLD-MCNC: 0.62 MG/DL (ref 0.6–1.3)
DEPRECATED RDW RBC AUTO: 42.5 FL (ref 37–54)
EOSINOPHIL # BLD AUTO: 0.11 10*3/MM3 (ref 0–0.3)
EOSINOPHIL NFR BLD AUTO: 2.8 % (ref 0–3)
ERYTHROCYTE [DISTWIDTH] IN BLOOD BY AUTOMATED COUNT: 12.3 % (ref 11.3–14.5)
GFR SERPL CREATININE-BSD FRML MDRD: 99 ML/MIN/1.73
GLOBULIN UR ELPH-MCNC: 2.1 GM/DL
GLUCOSE BLD-MCNC: 104 MG/DL (ref 70–100)
HCT VFR BLD AUTO: 40.8 % (ref 34.5–44)
HGB BLD-MCNC: 13.8 G/DL (ref 11.5–15.5)
IMM GRANULOCYTES # BLD: 0.01 10*3/MM3 (ref 0–0.03)
IMM GRANULOCYTES NFR BLD: 0.3 % (ref 0–0.6)
LYMPHOCYTES # BLD AUTO: 1.37 10*3/MM3 (ref 0.6–4.8)
LYMPHOCYTES NFR BLD AUTO: 34.3 % (ref 24–44)
MCH RBC QN AUTO: 32.2 PG (ref 27–31)
MCHC RBC AUTO-ENTMCNC: 33.8 G/DL (ref 32–36)
MCV RBC AUTO: 95.1 FL (ref 80–99)
MONOCYTES # BLD AUTO: 0.55 10*3/MM3 (ref 0–1)
MONOCYTES NFR BLD AUTO: 13.8 % (ref 0–12)
NEUTROPHILS # BLD AUTO: 1.94 10*3/MM3 (ref 1.5–8.3)
NEUTROPHILS NFR BLD AUTO: 48.3 % (ref 41–71)
PLATELET # BLD AUTO: 246 10*3/MM3 (ref 150–450)
PMV BLD AUTO: 9.5 FL (ref 6–12)
POTASSIUM BLD-SCNC: 4.2 MMOL/L (ref 3.5–5.5)
PROT SERPL-MCNC: 6.4 G/DL (ref 5.7–8.2)
RBC # BLD AUTO: 4.29 10*6/MM3 (ref 3.89–5.14)
SODIUM BLD-SCNC: 144 MMOL/L (ref 132–146)
T4 FREE SERPL-MCNC: 1.17 NG/DL (ref 0.89–1.76)
TSH SERPL DL<=0.05 MIU/L-ACNC: 2.87 MIU/ML (ref 0.35–5.35)
WBC NRBC COR # BLD: 4 10*3/MM3 (ref 3.5–10.8)

## 2018-08-02 PROCEDURE — 84443 ASSAY THYROID STIM HORMONE: CPT | Performed by: INTERNAL MEDICINE

## 2018-08-02 PROCEDURE — 80053 COMPREHEN METABOLIC PANEL: CPT | Performed by: INTERNAL MEDICINE

## 2018-08-02 PROCEDURE — 85025 COMPLETE CBC W/AUTO DIFF WBC: CPT | Performed by: INTERNAL MEDICINE

## 2018-08-02 PROCEDURE — 84439 ASSAY OF FREE THYROXINE: CPT | Performed by: INTERNAL MEDICINE

## 2018-08-14 ENCOUNTER — OFFICE VISIT (OUTPATIENT)
Dept: ENDOCRINOLOGY | Facility: CLINIC | Age: 58
End: 2018-08-14

## 2018-08-14 VITALS
WEIGHT: 132 LBS | BODY MASS INDEX: 21.99 KG/M2 | DIASTOLIC BLOOD PRESSURE: 60 MMHG | HEART RATE: 72 BPM | SYSTOLIC BLOOD PRESSURE: 100 MMHG | OXYGEN SATURATION: 99 % | HEIGHT: 65 IN

## 2018-08-14 DIAGNOSIS — E05.90 HYPERTHYROIDISM: Primary | ICD-10-CM

## 2018-08-14 PROCEDURE — 99213 OFFICE O/P EST LOW 20 MIN: CPT | Performed by: INTERNAL MEDICINE

## 2018-08-14 RX ORDER — METHIMAZOLE 5 MG/1
TABLET ORAL
Qty: 20 TABLET | Refills: 5 | Status: SHIPPED | OUTPATIENT
Start: 2018-08-14 | End: 2019-04-23 | Stop reason: SDUPTHER

## 2018-08-14 NOTE — PROGRESS NOTES
Falguni Alonzo 58 y.o.  CC:Follow-up and Hyperthyroidism      [place]: Follow-up and Hyperthyroidism    tsi 480 with dx  No palpitations  Reviewed blood work with her - normal tfts (t4 stable and tsh slightly higher)   Discussed reducing methimazole to 1/2 pill daily     Allergies   Allergen Reactions   • Erythromycin Hives and Rash       Current Outpatient Prescriptions:   •  Biotin 5000 MCG capsule, Take  by mouth., Disp: , Rfl:   •  Calcium Carbonate-Vitamin D (CALCIUM 500+D HIGH POTENCY) 500-400 MG-UNIT per tablet, Take 1 tablet by mouth., Disp: , Rfl:   •  Cholecalciferol (VITAMIN D) 1000 UNITS tablet, Take 1 tablet by mouth daily., Disp: , Rfl:   •  docusate sodium (COLACE) 250 MG capsule, Take 250 mg by mouth 2 (two) times a day., Disp: , Rfl:   •  minoxidil (ROGAINE) 2 % external solution, Apply  topically 2 (two) times a day., Disp: , Rfl:   •  Multiple Vitamins-Minerals (CENTRUM PO), Take 1 tablet by mouth daily., Disp: , Rfl:   •  polyethylene glycol (MIRALAX) packet, Take 17 g by mouth Daily., Disp: , Rfl:   •  TAPAZOLE 5 MG tablet, Take 1/2 pill daily a/w 3/4 pill QOD (Patient taking differently: Take 1/2 pill daily  On Mon, Wed and Friday  and 3/4 pill on Tues, Thurs, Sat and Sunday), Disp: 30 tablet, Rfl: 3  Patient Active Problem List    Diagnosis   • WINDY (stress urinary incontinence, female) [N39.3]   • Rectocele [N81.6]   • Menopause [Z78.0]   • Graves disease [E05.00]   • Fibrocystic breast changes, bilateral [N60.11, N60.12]   • Family history of breast cancer in mother [Z80.3]   • Chronic constipation [K59.09]   • Anemia [D64.9]     Overview Note:     Impression: 09/01/2015 - repeat cbc; Description: childhood     • Loss of hair [L65.9]     Overview Note:     Impression: 09/01/2015 - likely due to stress related to thyroid  will continue to monitor;      • Hyperthyroidism [E05.90]     Overview Note:     Impression: 09/01/2015 - check tfts  check cbc, cmp  hives in interim on methimazole  single day  of fever  discussed options for treatment  Impression: 06/22/2015 - check tfts, tsi, thyroid antibodies  ddx of thyroid overactivity was discussed along with options for treatment   f/u 8-10 weeks   patient counselled about risk of thyroid overactivity, symptoms related to this  also discussed risks and benefits of each treatment option.;        Review of Systems   Constitutional: Negative for activity change, appetite change, chills, diaphoresis, fatigue, fever and unexpected weight change.   HENT: Negative for congestion, dental problem, drooling, ear discharge, ear pain, facial swelling, hearing loss, mouth sores, nosebleeds, postnasal drip, rhinorrhea, sinus pressure, sneezing, sore throat, tinnitus, trouble swallowing and voice change.    Eyes: Negative for photophobia, pain, discharge, redness, itching and visual disturbance.   Respiratory: Negative for apnea, cough, choking, chest tightness, shortness of breath, wheezing and stridor.    Cardiovascular: Negative for chest pain, palpitations and leg swelling.   Gastrointestinal: Negative for abdominal distention, abdominal pain, anal bleeding, blood in stool, constipation, diarrhea, nausea, rectal pain and vomiting.   Endocrine: Negative for cold intolerance, heat intolerance, polydipsia, polyphagia and polyuria.   Genitourinary: Negative for decreased urine volume, difficulty urinating, dysuria, enuresis, flank pain, frequency, genital sores, hematuria and urgency.   Musculoskeletal: Negative for arthralgias, back pain, gait problem, joint swelling, myalgias, neck pain and neck stiffness.   Skin: Negative for color change, pallor, rash and wound.   Allergic/Immunologic: Negative for environmental allergies, food allergies and immunocompromised state.   Neurological: Negative for dizziness, tremors, seizures, syncope, facial asymmetry, speech difficulty, weakness, light-headedness, numbness and headaches.   Hematological: Negative for adenopathy. Does not  "bruise/bleed easily.   Psychiatric/Behavioral: Negative for agitation, behavioral problems, confusion, decreased concentration, dysphoric mood, hallucinations, self-injury, sleep disturbance and suicidal ideas. The patient is not nervous/anxious and is not hyperactive.      Social History     Social History   • Marital status:      Spouse name: N/A   • Number of children: N/A   • Years of education: N/A     Occupational History   • Not on file.     Social History Main Topics   • Smoking status: Never Smoker   • Smokeless tobacco: Never Used   • Alcohol use No   • Drug use: No   • Sexual activity: Yes     Partners: Male     Birth control/ protection: Surgical, Post-menopausal     Other Topics Concern   • Not on file     Social History Narrative   • No narrative on file     Family History   Problem Relation Age of Onset   • Arrhythmia Mother    • Cancer Mother    • Heart attack Father    • Diabetes type II Father    • Thyroid disease Father      /60   Pulse 72   Ht 165.7 cm (65.25\")   Wt 59.9 kg (132 lb)   LMP  (LMP Unknown)   SpO2 99%   BMI 21.80 kg/m²   Physical Exam   Constitutional: She is oriented to person, place, and time. She appears well-developed and well-nourished.   HENT:   Head: Normocephalic and atraumatic.   Nose: Nose normal.   Mouth/Throat: Oropharynx is clear and moist.   Eyes: Pupils are equal, round, and reactive to light. Conjunctivae, EOM and lids are normal.   Neck: Trachea normal and normal range of motion. Neck supple. Carotid bruit is not present. No tracheal deviation present. No thyroid mass and no thyromegaly present.   Cardiovascular: Normal rate, regular rhythm, normal heart sounds and intact distal pulses.  Exam reveals no gallop and no friction rub.    No murmur heard.  Pulmonary/Chest: Effort normal and breath sounds normal. No respiratory distress. She has no wheezes.   Musculoskeletal: Normal range of motion. She exhibits no edema or deformity.   Lymphadenopathy: "     She has no cervical adenopathy.   Neurological: She is alert and oriented to person, place, and time. She has normal reflexes. She displays normal reflexes. No cranial nerve deficit.   Skin: Skin is warm and dry. No rash noted. No cyanosis or erythema. Nails show no clubbing.   Psychiatric: She has a normal mood and affect. Her speech is normal and behavior is normal. Judgment and thought content normal. Cognition and memory are normal.   Nursing note and vitals reviewed.    Results for orders placed or performed in visit on 08/01/18   Comprehensive Metabolic Panel   Result Value Ref Range    Glucose 104 (H) 70 - 100 mg/dL    BUN 18 9 - 23 mg/dL    Creatinine 0.62 0.60 - 1.30 mg/dL    Sodium 144 132 - 146 mmol/L    Potassium 4.2 3.5 - 5.5 mmol/L    Chloride 104 99 - 109 mmol/L    CO2 31.0 20.0 - 31.0 mmol/L    Calcium 9.4 8.7 - 10.4 mg/dL    Total Protein 6.4 5.7 - 8.2 g/dL    Albumin 4.30 3.20 - 4.80 g/dL    ALT (SGPT) 30 7 - 40 U/L    AST (SGOT) 31 0 - 33 U/L    Alkaline Phosphatase 72 25 - 100 U/L    Total Bilirubin 0.4 0.3 - 1.2 mg/dL    eGFR Non African Amer 99 >60 mL/min/1.73    Globulin 2.1 gm/dL    A/G Ratio 2.0 1.5 - 2.5 g/dL    BUN/Creatinine Ratio 29.0 (H) 7.0 - 25.0    Anion Gap 9.0 3.0 - 11.0 mmol/L   CBC Auto Differential   Result Value Ref Range    WBC 4.00 3.50 - 10.80 10*3/mm3    RBC 4.29 3.89 - 5.14 10*6/mm3    Hemoglobin 13.8 11.5 - 15.5 g/dL    Hematocrit 40.8 34.5 - 44.0 %    MCV 95.1 80.0 - 99.0 fL    MCH 32.2 (H) 27.0 - 31.0 pg    MCHC 33.8 32.0 - 36.0 g/dL    RDW 12.3 11.3 - 14.5 %    RDW-SD 42.5 37.0 - 54.0 fl    MPV 9.5 6.0 - 12.0 fL    Platelets 246 150 - 450 10*3/mm3    Neutrophil % 48.3 41.0 - 71.0 %    Lymphocyte % 34.3 24.0 - 44.0 %    Monocyte % 13.8 (H) 0.0 - 12.0 %    Eosinophil % 2.8 0.0 - 3.0 %    Basophil % 0.5 0.0 - 1.0 %    Immature Grans % 0.3 0.0 - 0.6 %    Neutrophils, Absolute 1.94 1.50 - 8.30 10*3/mm3    Lymphocytes, Absolute 1.37 0.60 - 4.80 10*3/mm3    Monocytes,  Absolute 0.55 0.00 - 1.00 10*3/mm3    Eosinophils, Absolute 0.11 0.00 - 0.30 10*3/mm3    Basophils, Absolute 0.02 0.00 - 0.20 10*3/mm3    Immature Grans, Absolute 0.01 0.00 - 0.03 10*3/mm3   T4, Free   Result Value Ref Range    Free T4 1.17 0.89 - 1.76 ng/dL   TSH   Result Value Ref Range    TSH 2.873 0.350 - 5.350 mIU/mL     Problem List Items Addressed This Visit        Endocrine    Hyperthyroidism - Primary     Check cbc, cmp and tfts prior to next visit and prn symptoms   Check tsi with next visit          Relevant Medications    TAPAZOLE 5 MG tablet        Return in about 6 months (around 2/14/2019) for Recheck 30 min .    Felicity Walsh MA  Signed Karie Whitney MD

## 2018-11-26 ENCOUNTER — TELEPHONE (OUTPATIENT)
Dept: INTERNAL MEDICINE | Facility: CLINIC | Age: 58
End: 2018-11-26

## 2018-11-26 DIAGNOSIS — E05.90 HYPERTHYROIDISM: Primary | ICD-10-CM

## 2018-11-26 NOTE — TELEPHONE ENCOUNTER
PT CALLED AND STATED THAT  TOLD HER WHEN SHE WAS READY TO COME IN TO GET HER THYROID LEVELS CHECKED, TO CALL AND  WOULD PUT THE ORDERS IN; PT STATED IS PLANNING ON COMING IN THIS Thursday FOR THOSE LAB TESTS; PLEASE CALL AND ADVISE PT ONCE LABS ARE PUT IN [phone]

## 2018-11-29 ENCOUNTER — FLU SHOT (OUTPATIENT)
Dept: INTERNAL MEDICINE | Facility: CLINIC | Age: 58
End: 2018-11-29

## 2018-11-29 DIAGNOSIS — Z23 INFLUENZA VACCINE NEEDED: ICD-10-CM

## 2018-11-29 LAB
ALBUMIN SERPL-MCNC: 4.43 G/DL (ref 3.2–4.8)
ALBUMIN/GLOB SERPL: 2.5 G/DL (ref 1.5–2.5)
ALP SERPL-CCNC: 76 U/L (ref 25–100)
ALT SERPL W P-5'-P-CCNC: 24 U/L (ref 7–40)
ANION GAP SERPL CALCULATED.3IONS-SCNC: 3 MMOL/L (ref 3–11)
AST SERPL-CCNC: 27 U/L (ref 0–33)
BASOPHILS # BLD AUTO: 0.01 10*3/MM3 (ref 0–0.2)
BASOPHILS NFR BLD AUTO: 0.2 % (ref 0–1)
BILIRUB SERPL-MCNC: 0.3 MG/DL (ref 0.3–1.2)
BUN BLD-MCNC: 16 MG/DL (ref 9–23)
BUN/CREAT SERPL: 25 (ref 7–25)
CALCIUM SPEC-SCNC: 9.1 MG/DL (ref 8.7–10.4)
CHLORIDE SERPL-SCNC: 102 MMOL/L (ref 99–109)
CO2 SERPL-SCNC: 32 MMOL/L (ref 20–31)
CREAT BLD-MCNC: 0.64 MG/DL (ref 0.6–1.3)
DEPRECATED RDW RBC AUTO: 45.3 FL (ref 37–54)
EOSINOPHIL # BLD AUTO: 0.11 10*3/MM3 (ref 0–0.3)
EOSINOPHIL NFR BLD AUTO: 2.6 % (ref 0–3)
ERYTHROCYTE [DISTWIDTH] IN BLOOD BY AUTOMATED COUNT: 12.6 % (ref 11.3–14.5)
GFR SERPL CREATININE-BSD FRML MDRD: 95 ML/MIN/1.73
GLOBULIN UR ELPH-MCNC: 1.8 GM/DL
GLUCOSE BLD-MCNC: 98 MG/DL (ref 70–100)
HCT VFR BLD AUTO: 42.6 % (ref 34.5–44)
HGB BLD-MCNC: 13.7 G/DL (ref 11.5–15.5)
IMM GRANULOCYTES # BLD: 0.01 10*3/MM3 (ref 0–0.03)
IMM GRANULOCYTES NFR BLD: 0.2 % (ref 0–0.6)
LYMPHOCYTES # BLD AUTO: 1.55 10*3/MM3 (ref 0.6–4.8)
LYMPHOCYTES NFR BLD AUTO: 36 % (ref 24–44)
MCH RBC QN AUTO: 31.6 PG (ref 27–31)
MCHC RBC AUTO-ENTMCNC: 32.2 G/DL (ref 32–36)
MCV RBC AUTO: 98.2 FL (ref 80–99)
MONOCYTES # BLD AUTO: 0.6 10*3/MM3 (ref 0–1)
MONOCYTES NFR BLD AUTO: 14 % (ref 0–12)
NEUTROPHILS # BLD AUTO: 2.03 10*3/MM3 (ref 1.5–8.3)
NEUTROPHILS NFR BLD AUTO: 47.2 % (ref 41–71)
PLATELET # BLD AUTO: 265 10*3/MM3 (ref 150–450)
PMV BLD AUTO: 10.1 FL (ref 6–12)
POTASSIUM BLD-SCNC: 3.8 MMOL/L (ref 3.5–5.5)
PROT SERPL-MCNC: 6.2 G/DL (ref 5.7–8.2)
RBC # BLD AUTO: 4.34 10*6/MM3 (ref 3.89–5.14)
SODIUM BLD-SCNC: 137 MMOL/L (ref 132–146)
T4 FREE SERPL-MCNC: 0.97 NG/DL (ref 0.89–1.76)
TSH SERPL DL<=0.05 MIU/L-ACNC: 2.77 MIU/ML (ref 0.35–5.35)
WBC NRBC COR # BLD: 4.3 10*3/MM3 (ref 3.5–10.8)

## 2018-11-29 PROCEDURE — 85025 COMPLETE CBC W/AUTO DIFF WBC: CPT | Performed by: INTERNAL MEDICINE

## 2018-11-29 PROCEDURE — 90674 CCIIV4 VAC NO PRSV 0.5 ML IM: CPT | Performed by: INTERNAL MEDICINE

## 2018-11-29 PROCEDURE — 80053 COMPREHEN METABOLIC PANEL: CPT | Performed by: INTERNAL MEDICINE

## 2018-11-29 PROCEDURE — 90471 IMMUNIZATION ADMIN: CPT | Performed by: INTERNAL MEDICINE

## 2018-11-29 PROCEDURE — 84439 ASSAY OF FREE THYROXINE: CPT | Performed by: INTERNAL MEDICINE

## 2018-11-29 PROCEDURE — 84445 ASSAY OF TSI GLOBULIN: CPT | Performed by: INTERNAL MEDICINE

## 2018-11-29 PROCEDURE — 84443 ASSAY THYROID STIM HORMONE: CPT | Performed by: INTERNAL MEDICINE

## 2018-11-30 LAB — TSI SER-MCNC: 0.12 IU/L (ref 0–0.55)

## 2019-01-29 ENCOUNTER — OFFICE VISIT (OUTPATIENT)
Dept: OBSTETRICS AND GYNECOLOGY | Facility: CLINIC | Age: 59
End: 2019-01-29

## 2019-01-29 VITALS
HEIGHT: 65 IN | WEIGHT: 138.6 LBS | DIASTOLIC BLOOD PRESSURE: 58 MMHG | SYSTOLIC BLOOD PRESSURE: 106 MMHG | BODY MASS INDEX: 23.09 KG/M2

## 2019-01-29 DIAGNOSIS — N60.12 FIBROCYSTIC BREAST CHANGES, BILATERAL: ICD-10-CM

## 2019-01-29 DIAGNOSIS — Z78.0 MENOPAUSE: Primary | ICD-10-CM

## 2019-01-29 DIAGNOSIS — Z80.3 FAMILY HISTORY OF BREAST CANCER IN MOTHER: ICD-10-CM

## 2019-01-29 DIAGNOSIS — N60.11 FIBROCYSTIC BREAST CHANGES, BILATERAL: ICD-10-CM

## 2019-01-29 PROCEDURE — 99396 PREV VISIT EST AGE 40-64: CPT | Performed by: OBSTETRICS & GYNECOLOGY

## 2019-01-29 NOTE — PROGRESS NOTES
Chief Complaint   Patient presents with   • Gynecologic Exam       Falguni Alonzo is a 58 y.o. year old  presenting to be seen for her annual exam.  This patient has previously had a laparoscopically assisted vaginal hysterectomy/vaginal cuff suspension and an anterior-posterior repair.  She has occasional leakage of urine but is not incapacitated with this.  She has chronic constipation which is treated.  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    SCREENING TESTS    Year 2012   Age                         PAP     Neg.                    HPV high risk                         Mammogram       benign benign                 NANCY score                         Breast MRI                         Lipids                         Vitamin D                         Colonoscopy                         DEXA  Frax (hip/any)                         Ovarian Screen                             She exercises regularly: yes.  She wears her seat belt: yes.  She has concerns about domestic violence: no.  She has noticed changes in height: no    GYN screening history:  · Last mammogram: was done on approximately 2019 and the result was: Birads II (Benign findings)..    No Additional Complaints Reported    The following portions of the patient's history were reviewed and updated as appropriate:vital signs and   She  has a past medical history of Anemia, Chronic constipation, Family history of breast cancer in mother, Fibrocystic breast changes, bilateral, Graves disease, Hair loss, Hyperthyroidism, Menopause, Rectocele, and WINDY (stress urinary incontinence, female).  She does not have any pertinent problems on file.  She  has a past surgical history that includes Tonsillectomy; Tonsillectomy; Tubal ligation; and laparoscopic assisted vaginal hysterectomy, anterior and posterior vaginal repair.  Her  "family history includes Arrhythmia in her mother; Cancer in her mother; Diabetes type II in her father; Heart attack in her father; Thyroid disease in her father.  She  reports that  has never smoked. she has never used smokeless tobacco. She reports that she does not drink alcohol or use drugs.  Current Outpatient Medications   Medication Sig Dispense Refill   • Calcium Carbonate-Vitamin D (CALCIUM 500+D HIGH POTENCY) 500-400 MG-UNIT per tablet Take 1 tablet by mouth.     • Cholecalciferol (VITAMIN D) 1000 UNITS tablet Take 1 tablet by mouth daily.     • docusate sodium (COLACE) 250 MG capsule Take 250 mg by mouth 2 (two) times a day.     • minoxidil (ROGAINE) 2 % external solution Apply  topically 2 (two) times a day.     • Multiple Vitamins-Minerals (CENTRUM PO) Take 1 tablet by mouth daily.     • polyethylene glycol (MIRALAX) packet Take 17 g by mouth Daily.     • TAPAZOLE 5 MG tablet Take 1/2 pill daily  On Mon, Wed and Friday  and 3/4 pill on Tues, Thurs, Sat and Sunday 20 tablet 5     No current facility-administered medications for this visit.      She is allergic to erythromycin..    Review of Systems  A comprehensive review of systems was taken.  Constitutional: negative for fever, chills, activity change, appetite change, fatigue and unexpected weight change.  Respiratory: negative  Cardiovascular: negative  Gastrointestinal: positive for constipation  Genitourinary:negative  Musculoskeletal:negative  Behavioral/Psych: negative       /58   Ht 165.7 cm (65.25\")   Wt 62.9 kg (138 lb 9.6 oz)   LMP  (LMP Unknown)   BMI 22.89 kg/m²     Physical Exam    General:  alert; cooperative; well developed; well nourished   Skin:  No suspicious lesions seen   Thyroid: normal to inspection and palpation   Lungs:  clear to auscultation bilaterally   Heart:  regular rate and rhythm, S1, S2 normal, no murmur, click, rub or gallop   Breasts:  Examined in supine position  Symmetric without masses or skin " dimpling  Nipples normal without inversion, lesions or discharge  There are no palpable axillary nodes  Fibrocystic changes are present both breasts without a discrete mass   Abdomen: soft, non-tender; no masses  no umbilical or inguinal hernias are present  no hepato-splenomegaly   Pelvis: Clinical staff was present for exam  External genitalia:  normal appearance of the external genitalia including Bartholin's and Bratenahl's glands.  Vaginal:  normal pink mucosa without prolapse or lesions.  Cervix:  absent.  Uterus:  absent.  Adnexa:  non palpable bilaterally.  Rectal:  anus visually normal appearing. recto-vaginal exam unremarkable and confirms findings;  Cystocele GRADE 1     Lab Review   No data reviewed    Imaging  Mammogram results         ASSESSMENT  Problems Addressed this Visit        Genitourinary    Menopause - Primary       Other    Fibrocystic breast changes, bilateral    Family history of breast cancer in mother          PLAN    · Medications prescribed this encounter:  No orders of the defined types were placed in this encounter.  · Monthly self breast assessment and annual breast imaging  · I have counseled the patient to consider a cardiovascular workup  · Kegel's exercises with 15 tightenings 5 times a day, avoid heavy lifting and straining  · Calcium, 600 mg/ Vit. D, 400 IU daily; regular weight-bearing exercise  · Follow up: 12 month(s)  *Please note that portions of this documentation may have been completed with a voice recognition program.  Efforts were made to edit this dictation, but occasional words may have been mistranscribed.       This note was electronically signed.    CHEY Hayward MD  January 29, 2019  11:27 AM

## 2019-02-05 ENCOUNTER — TELEPHONE (OUTPATIENT)
Dept: INTERNAL MEDICINE | Facility: CLINIC | Age: 59
End: 2019-02-05

## 2019-02-05 DIAGNOSIS — E05.90 HYPERTHYROIDISM: Primary | ICD-10-CM

## 2019-02-05 NOTE — TELEPHONE ENCOUNTER
PATIENT IS CALLING TO ASK IF SHE COULD PLEASE HAVE HER LABS PUT IN SO SHE CAN HAVE THEM DONE BEFORE HER APPT ON FEB 19. SHE CAN BE REACHED IF NEEDED -[phone]

## 2019-02-15 LAB
ALBUMIN SERPL-MCNC: 4.66 G/DL (ref 3.2–4.8)
ALBUMIN/GLOB SERPL: 2.4 G/DL (ref 1.5–2.5)
ALP SERPL-CCNC: 81 U/L (ref 25–100)
ALT SERPL W P-5'-P-CCNC: 28 U/L (ref 7–40)
ANION GAP SERPL CALCULATED.3IONS-SCNC: 4 MMOL/L (ref 3–11)
AST SERPL-CCNC: 28 U/L (ref 0–33)
BASOPHILS # BLD AUTO: 0.01 10*3/MM3 (ref 0–0.2)
BASOPHILS NFR BLD AUTO: 0.2 % (ref 0–1)
BILIRUB SERPL-MCNC: 0.4 MG/DL (ref 0.3–1.2)
BUN BLD-MCNC: 18 MG/DL (ref 9–23)
BUN/CREAT SERPL: 25 (ref 7–25)
CALCIUM SPEC-SCNC: 10.2 MG/DL (ref 8.7–10.4)
CHLORIDE SERPL-SCNC: 104 MMOL/L (ref 99–109)
CO2 SERPL-SCNC: 32 MMOL/L (ref 20–31)
CREAT BLD-MCNC: 0.72 MG/DL (ref 0.6–1.3)
DEPRECATED RDW RBC AUTO: 45.3 FL (ref 37–54)
EOSINOPHIL # BLD AUTO: 0.1 10*3/MM3 (ref 0–0.3)
EOSINOPHIL NFR BLD AUTO: 2.2 % (ref 0–3)
ERYTHROCYTE [DISTWIDTH] IN BLOOD BY AUTOMATED COUNT: 12.7 % (ref 11.3–14.5)
GFR SERPL CREATININE-BSD FRML MDRD: 83 ML/MIN/1.73
GLOBULIN UR ELPH-MCNC: 1.9 GM/DL
GLUCOSE BLD-MCNC: 102 MG/DL (ref 70–100)
HCT VFR BLD AUTO: 42.2 % (ref 34.5–44)
HGB BLD-MCNC: 13.8 G/DL (ref 11.5–15.5)
IMM GRANULOCYTES # BLD AUTO: 0.01 10*3/MM3 (ref 0–0.05)
IMM GRANULOCYTES NFR BLD AUTO: 0.2 % (ref 0–0.6)
LYMPHOCYTES # BLD AUTO: 1.52 10*3/MM3 (ref 0.6–4.8)
LYMPHOCYTES NFR BLD AUTO: 33.5 % (ref 24–44)
MCH RBC QN AUTO: 31.8 PG (ref 27–31)
MCHC RBC AUTO-ENTMCNC: 32.7 G/DL (ref 32–36)
MCV RBC AUTO: 97.2 FL (ref 80–99)
MONOCYTES # BLD AUTO: 0.49 10*3/MM3 (ref 0–1)
MONOCYTES NFR BLD AUTO: 10.8 % (ref 0–12)
NEUTROPHILS # BLD AUTO: 2.42 10*3/MM3 (ref 1.5–8.3)
NEUTROPHILS NFR BLD AUTO: 53.3 % (ref 41–71)
PLATELET # BLD AUTO: 293 10*3/MM3 (ref 150–450)
PMV BLD AUTO: 9.8 FL (ref 6–12)
POTASSIUM BLD-SCNC: 4.1 MMOL/L (ref 3.5–5.5)
PROT SERPL-MCNC: 6.6 G/DL (ref 5.7–8.2)
RBC # BLD AUTO: 4.34 10*6/MM3 (ref 3.89–5.14)
SODIUM BLD-SCNC: 140 MMOL/L (ref 132–146)
T4 FREE SERPL-MCNC: 0.96 NG/DL (ref 0.89–1.76)
TSH SERPL DL<=0.05 MIU/L-ACNC: 2.51 MIU/ML (ref 0.35–5.35)
WBC NRBC COR # BLD: 4.54 10*3/MM3 (ref 3.5–10.8)

## 2019-02-15 PROCEDURE — 85025 COMPLETE CBC W/AUTO DIFF WBC: CPT | Performed by: INTERNAL MEDICINE

## 2019-02-15 PROCEDURE — 84443 ASSAY THYROID STIM HORMONE: CPT | Performed by: INTERNAL MEDICINE

## 2019-02-15 PROCEDURE — 84439 ASSAY OF FREE THYROXINE: CPT | Performed by: INTERNAL MEDICINE

## 2019-02-15 PROCEDURE — 80053 COMPREHEN METABOLIC PANEL: CPT | Performed by: INTERNAL MEDICINE

## 2019-04-23 ENCOUNTER — OFFICE VISIT (OUTPATIENT)
Dept: ENDOCRINOLOGY | Facility: CLINIC | Age: 59
End: 2019-04-23

## 2019-04-23 VITALS
SYSTOLIC BLOOD PRESSURE: 124 MMHG | HEART RATE: 67 BPM | DIASTOLIC BLOOD PRESSURE: 68 MMHG | BODY MASS INDEX: 23.29 KG/M2 | HEIGHT: 65 IN | WEIGHT: 139.8 LBS | OXYGEN SATURATION: 98 %

## 2019-04-23 DIAGNOSIS — E05.90 HYPERTHYROIDISM: Primary | ICD-10-CM

## 2019-04-23 DIAGNOSIS — R07.89 CHEST TIGHTNESS: ICD-10-CM

## 2019-04-23 LAB
T4 FREE SERPL-MCNC: 1.18 NG/DL (ref 0.93–1.7)
TSH SERPL DL<=0.05 MIU/L-ACNC: 3.28 MIU/ML (ref 0.27–4.2)

## 2019-04-23 PROCEDURE — 99214 OFFICE O/P EST MOD 30 MIN: CPT | Performed by: INTERNAL MEDICINE

## 2019-04-23 PROCEDURE — 84443 ASSAY THYROID STIM HORMONE: CPT | Performed by: INTERNAL MEDICINE

## 2019-04-23 PROCEDURE — 84439 ASSAY OF FREE THYROXINE: CPT | Performed by: INTERNAL MEDICINE

## 2019-04-23 PROCEDURE — 93000 ELECTROCARDIOGRAM COMPLETE: CPT | Performed by: INTERNAL MEDICINE

## 2019-04-23 RX ORDER — METHIMAZOLE 5 MG/1
TABLET ORAL
Qty: 15 TABLET | Refills: 5 | Status: SHIPPED | OUTPATIENT
Start: 2019-04-23 | End: 2019-11-19

## 2019-04-23 NOTE — ASSESSMENT & PLAN NOTE
New c/o - exertional tightness - Baseline ekg - abnormal   Refer stress test and echo   She does walk and symptoms are not consistent, usually resolve without any specific action on her part  No fam h/o early cad   No sweating, nausea or shortness of breath   Low probability as baseline but symptoms currently with abnormal baseline ekg

## 2019-04-23 NOTE — PROGRESS NOTES
Falguni Cheri 58 y.o.  CC:Follow-up and Hyperthyroidism      [place]: Follow-up and Hyperthyroidism    Is on 1/2 pill daily tapazole - energy is stable- recent tft, cmp and cbc reviewed    New c/o occ chest tightness with stress, walking up hill (up to 5/10 severity ) located mid sternal without radiation   No assoc sweating or shortness of breath   She does walk nearly every day - does not predictably have symptoms but if she does they are with activity (sometimes walking, sometimes climbing steps, sometimes at work with stress)   Has not had baseline ekg , etc  Chest tightness is irregular - relieved without specific action on her part  Has had lipids via her PCP - all in good range  bp is good  No family h/o early cad / cardiac death     Allergies   Allergen Reactions   • Erythromycin Hives and Rash       Current Outpatient Medications:   •  Calcium Carbonate-Vitamin D (CALCIUM 500+D HIGH POTENCY) 500-400 MG-UNIT per tablet, Take 1 tablet by mouth., Disp: , Rfl:   •  Cholecalciferol (VITAMIN D) 1000 UNITS tablet, Take 1 tablet by mouth daily., Disp: , Rfl:   •  docusate sodium (COLACE) 250 MG capsule, Take 250 mg by mouth 2 (two) times a day., Disp: , Rfl:   •  minoxidil (ROGAINE) 2 % external solution, Apply  topically 2 (two) times a day., Disp: , Rfl:   •  Multiple Vitamins-Minerals (CENTRUM PO), Take 1 tablet by mouth daily., Disp: , Rfl:   •  polyethylene glycol (MIRALAX) packet, Take 17 g by mouth Daily., Disp: , Rfl:   •  TAPAZOLE 5 MG tablet, Take 1/2 tablet PO daily, Disp: 15 tablet, Rfl: 5  Patient Active Problem List    Diagnosis   • Chest tightness [R07.89]   • Hair loss [L65.9]   • WINDY (stress urinary incontinence, female) [N39.3]   • Rectocele [N81.6]   • Menopause [Z78.0]   • Graves disease [E05.00]   • Fibrocystic breast changes, bilateral [N60.11, N60.12]   • Family history of breast cancer in mother [Z80.3]   • Chronic constipation [K59.09]   • Anemia [D64.9]   • Loss of hair [L65.9]   •  Hyperthyroidism [E05.90]     Review of Systems   Constitutional: Negative for activity change, appetite change, chills, diaphoresis, fatigue, fever and unexpected weight change.   HENT: Negative for congestion, dental problem, drooling, ear discharge, ear pain, facial swelling, hearing loss, mouth sores, nosebleeds, postnasal drip, rhinorrhea, sinus pressure, sneezing, sore throat, tinnitus, trouble swallowing and voice change.    Eyes: Negative for photophobia, pain, discharge, redness, itching and visual disturbance.   Respiratory: Negative for apnea, cough, choking, chest tightness, shortness of breath, wheezing and stridor.    Cardiovascular: Negative for chest pain, palpitations and leg swelling.        Chest tightness   Gastrointestinal: Negative for abdominal distention, abdominal pain, anal bleeding, blood in stool, constipation, diarrhea, nausea, rectal pain and vomiting.   Endocrine: Negative for cold intolerance, heat intolerance, polydipsia, polyphagia and polyuria.   Genitourinary: Negative for decreased urine volume, difficulty urinating, dysuria, enuresis, flank pain, frequency, genital sores, hematuria and urgency.   Musculoskeletal: Negative for arthralgias, back pain, gait problem, joint swelling, myalgias, neck pain and neck stiffness.   Skin: Negative for color change, pallor, rash and wound.   Allergic/Immunologic: Negative for environmental allergies, food allergies and immunocompromised state.   Neurological: Negative for dizziness, tremors, seizures, syncope, facial asymmetry, speech difficulty, weakness, light-headedness, numbness and headaches.   Hematological: Negative for adenopathy. Does not bruise/bleed easily.   Psychiatric/Behavioral: Negative for agitation, behavioral problems, confusion, decreased concentration, dysphoric mood, hallucinations, self-injury, sleep disturbance and suicidal ideas. The patient is not nervous/anxious and is not hyperactive.      Social History  "    Socioeconomic History   • Marital status:      Spouse name: Not on file   • Number of children: Not on file   • Years of education: Not on file   • Highest education level: Not on file   Tobacco Use   • Smoking status: Never Smoker   • Smokeless tobacco: Never Used   Substance and Sexual Activity   • Alcohol use: No   • Drug use: No   • Sexual activity: Yes     Partners: Male     Birth control/protection: Surgical, Post-menopausal     Family History   Problem Relation Age of Onset   • Arrhythmia Mother    • Cancer Mother    • Heart attack Father    • Diabetes type II Father    • Thyroid disease Father      /68   Pulse 67   Ht 165.1 cm (65\")   Wt 63.4 kg (139 lb 12.8 oz)   LMP  (LMP Unknown)   SpO2 98%   BMI 23.26 kg/m²   Physical Exam   Constitutional: She is oriented to person, place, and time. She appears well-developed and well-nourished.   HENT:   Head: Normocephalic and atraumatic.   Nose: Nose normal.   Mouth/Throat: Oropharynx is clear and moist.   Eyes: Conjunctivae, EOM and lids are normal. Pupils are equal, round, and reactive to light.   Neck: Trachea normal and normal range of motion. Neck supple. Carotid bruit is not present. No tracheal deviation present. No thyroid mass and no thyromegaly present.   Cardiovascular: Normal rate, regular rhythm, normal heart sounds and intact distal pulses. Exam reveals no gallop and no friction rub.   No murmur heard.  Pulmonary/Chest: Effort normal and breath sounds normal. No respiratory distress. She has no wheezes.   Musculoskeletal: Normal range of motion. She exhibits no edema or deformity.   Lymphadenopathy:     She has no cervical adenopathy.   Neurological: She is alert and oriented to person, place, and time. She has normal reflexes. She displays normal reflexes. No cranial nerve deficit.   Skin: Skin is warm and dry. No rash noted. No cyanosis or erythema. Nails show no clubbing.   Psychiatric: She has a normal mood and affect. Her " speech is normal and behavior is normal. Judgment and thought content normal. Cognition and memory are normal.   Nursing note and vitals reviewed.    Results for orders placed or performed in visit on 02/05/19   Comprehensive Metabolic Panel   Result Value Ref Range    Glucose 102 (H) 70 - 100 mg/dL    BUN 18 9 - 23 mg/dL    Creatinine 0.72 0.60 - 1.30 mg/dL    Sodium 140 132 - 146 mmol/L    Potassium 4.1 3.5 - 5.5 mmol/L    Chloride 104 99 - 109 mmol/L    CO2 32.0 (H) 20.0 - 31.0 mmol/L    Calcium 10.2 8.7 - 10.4 mg/dL    Total Protein 6.6 5.7 - 8.2 g/dL    Albumin 4.66 3.20 - 4.80 g/dL    ALT (SGPT) 28 7 - 40 U/L    AST (SGOT) 28 0 - 33 U/L    Alkaline Phosphatase 81 25 - 100 U/L    Total Bilirubin 0.4 0.3 - 1.2 mg/dL    eGFR Non African Amer 83 >60 mL/min/1.73    Globulin 1.9 gm/dL    A/G Ratio 2.4 1.5 - 2.5 g/dL    BUN/Creatinine Ratio 25.0 7.0 - 25.0    Anion Gap 4.0 3.0 - 11.0 mmol/L   CBC Auto Differential   Result Value Ref Range    WBC 4.54 3.50 - 10.80 10*3/mm3    RBC 4.34 3.89 - 5.14 10*6/mm3    Hemoglobin 13.8 11.5 - 15.5 g/dL    Hematocrit 42.2 34.5 - 44.0 %    MCV 97.2 80.0 - 99.0 fL    MCH 31.8 (H) 27.0 - 31.0 pg    MCHC 32.7 32.0 - 36.0 g/dL    RDW 12.7 11.3 - 14.5 %    RDW-SD 45.3 37.0 - 54.0 fl    MPV 9.8 6.0 - 12.0 fL    Platelets 293 150 - 450 10*3/mm3    Neutrophil % 53.3 41.0 - 71.0 %    Lymphocyte % 33.5 24.0 - 44.0 %    Monocyte % 10.8 0.0 - 12.0 %    Eosinophil % 2.2 0.0 - 3.0 %    Basophil % 0.2 0.0 - 1.0 %    Immature Grans % 0.2 0.0 - 0.6 %    Neutrophils, Absolute 2.42 1.50 - 8.30 10*3/mm3    Lymphocytes, Absolute 1.52 0.60 - 4.80 10*3/mm3    Monocytes, Absolute 0.49 0.00 - 1.00 10*3/mm3    Eosinophils, Absolute 0.10 0.00 - 0.30 10*3/mm3    Basophils, Absolute 0.01 0.00 - 0.20 10*3/mm3    Immature Grans, Absolute 0.01 0.00 - 0.05 10*3/mm3   T4, Free   Result Value Ref Range    Free T4 0.96 0.89 - 1.76 ng/dL   TSH   Result Value Ref Range    TSH 2.511 0.350 - 5.350 mIU/mL       ECG 12  Lead  Date/Time: 4/23/2019 11:43 AM  Performed by: Karie Whitney MD  Authorized by: Karie Whitney MD   Comparison: not compared with previous ECG   Previous ECG: no previous ECG available  Rhythm: sinus rhythm  Rate: normal  BPM: 66  Conduction: conduction normal  Q waves: III and aVF    ST Segments: ST segments normal  T Waves: T waves normal  QRS axis: left    Clinical impression: abnormal EKG  Comments: ETT and echo ordered           Problem List Items Addressed This Visit        Endocrine    Hyperthyroidism - Primary     Recent tfts  Check t4 and tsh   Discussed risk          Relevant Medications    TAPAZOLE 5 MG tablet    Other Relevant Orders    T4, Free    TSH    ECG 12 Lead       Nervous and Auditory    Chest tightness     New c/o - exertional tightness - Baseline ekg - abnormal   Refer stress test and echo   She does walk and symptoms are not consistent, usually resolve without any specific action on her part  No fam h/o early cad   No sweating, nausea or shortness of breath   Low probability as baseline but symptoms currently with abnormal baseline ekg          Relevant Orders    Adult Transthoracic Echo Complete W/ Cont if Necessary Per Protocol    Treadmill Stress Test        Return in about 6 months (around 10/23/2019) for Recheck 30 min .    Karie Whitney MD  Signed Karie Whitney MD

## 2019-05-21 ENCOUNTER — HOSPITAL ENCOUNTER (OUTPATIENT)
Dept: CARDIOLOGY | Facility: HOSPITAL | Age: 59
Discharge: HOME | End: 2019-05-21

## 2019-05-21 ENCOUNTER — HOSPITAL ENCOUNTER (OUTPATIENT)
Dept: CARDIOLOGY | Facility: HOSPITAL | Age: 59
Discharge: HOME | End: 2019-05-21
Admitting: INTERNAL MEDICINE

## 2019-05-21 VITALS
DIASTOLIC BLOOD PRESSURE: 62 MMHG | HEART RATE: 86 BPM | SYSTOLIC BLOOD PRESSURE: 114 MMHG | WEIGHT: 139.77 LBS | HEIGHT: 65 IN | BODY MASS INDEX: 23.29 KG/M2

## 2019-05-21 DIAGNOSIS — R07.89 CHEST TIGHTNESS: ICD-10-CM

## 2019-05-21 LAB
BH CV ECHO MEAS - AO ROOT AREA (BSA CORRECTED): 1.8
BH CV ECHO MEAS - AO ROOT AREA: 7.5 CM^2
BH CV ECHO MEAS - AO ROOT DIAM: 3.1 CM
BH CV ECHO MEAS - BSA(HAYCOCK): 1.7 M^2
BH CV ECHO MEAS - BSA: 1.7 M^2
BH CV ECHO MEAS - BZI_BMI: 23.1 KILOGRAMS/M^2
BH CV ECHO MEAS - BZI_METRIC_HEIGHT: 165.1 CM
BH CV ECHO MEAS - BZI_METRIC_WEIGHT: 63.1 KG
BH CV ECHO MEAS - EDV(CUBED): 41.4 ML
BH CV ECHO MEAS - EDV(MOD-SP2): 58 ML
BH CV ECHO MEAS - EDV(MOD-SP4): 69 ML
BH CV ECHO MEAS - EDV(TEICH): 49.4 ML
BH CV ECHO MEAS - EF(CUBED): 55.6 %
BH CV ECHO MEAS - EF(MOD-BP): 76 %
BH CV ECHO MEAS - EF(MOD-SP2): 72.4 %
BH CV ECHO MEAS - EF(MOD-SP4): 79.7 %
BH CV ECHO MEAS - EF(TEICH): 48.4 %
BH CV ECHO MEAS - ESV(CUBED): 18.4 ML
BH CV ECHO MEAS - ESV(MOD-SP2): 16 ML
BH CV ECHO MEAS - ESV(MOD-SP4): 14 ML
BH CV ECHO MEAS - ESV(TEICH): 25.5 ML
BH CV ECHO MEAS - FS: 23.7 %
BH CV ECHO MEAS - IVS/LVPW: 1.2
BH CV ECHO MEAS - IVSD: 1.2 CM
BH CV ECHO MEAS - LA DIMENSION: 4.1 CM
BH CV ECHO MEAS - LA/AO: 1.3
BH CV ECHO MEAS - LAD MAJOR: 4.2 CM
BH CV ECHO MEAS - LAT PEAK E' VEL: 9.3 CM/SEC
BH CV ECHO MEAS - LATERAL E/E' RATIO: 4.7
BH CV ECHO MEAS - LV DIASTOLIC VOL/BSA (35-75): 40.7 ML/M^2
BH CV ECHO MEAS - LV MASS(C)D: 120.3 GRAMS
BH CV ECHO MEAS - LV MASS(C)DI: 71 GRAMS/M^2
BH CV ECHO MEAS - LV SYSTOLIC VOL/BSA (12-30): 8.3 ML/M^2
BH CV ECHO MEAS - LVIDD: 3.5 CM
BH CV ECHO MEAS - LVIDS: 2.6 CM
BH CV ECHO MEAS - LVLD AP2: 7.2 CM
BH CV ECHO MEAS - LVLD AP4: 7.2 CM
BH CV ECHO MEAS - LVLS AP2: 5.4 CM
BH CV ECHO MEAS - LVLS AP4: 5.2 CM
BH CV ECHO MEAS - LVPWD: 1 CM
BH CV ECHO MEAS - MED PEAK E' VEL: 7.5 CM/SEC
BH CV ECHO MEAS - MEDIAL E/E' RATIO: 5.8
BH CV ECHO MEAS - MV A MAX VEL: 56.3 CM/SEC
BH CV ECHO MEAS - MV DEC TIME: 0.32 SEC
BH CV ECHO MEAS - MV E MAX VEL: 44.4 CM/SEC
BH CV ECHO MEAS - MV E/A: 0.79
BH CV ECHO MEAS - PA ACC SLOPE: 486.9 CM/SEC^2
BH CV ECHO MEAS - PA ACC TIME: 0.13 SEC
BH CV ECHO MEAS - PA PR(ACCEL): 18.8 MMHG
BH CV ECHO MEAS - RAP SYSTOLE: 3 MMHG
BH CV ECHO MEAS - RVSP: 18 MMHG
BH CV ECHO MEAS - SI(CUBED): 13.6 ML/M^2
BH CV ECHO MEAS - SI(MOD-SP2): 24.8 ML/M^2
BH CV ECHO MEAS - SI(MOD-SP4): 32.5 ML/M^2
BH CV ECHO MEAS - SI(TEICH): 14.1 ML/M^2
BH CV ECHO MEAS - SV(CUBED): 23 ML
BH CV ECHO MEAS - SV(MOD-SP2): 42 ML
BH CV ECHO MEAS - SV(MOD-SP4): 55 ML
BH CV ECHO MEAS - SV(TEICH): 23.9 ML
BH CV ECHO MEAS - TAPSE (>1.6): 2.2 CM2
BH CV ECHO MEAS - TR MAX PG: 15 MMHG
BH CV ECHO MEAS - TR MAX VEL: 191.8 CM/SEC
BH CV ECHO MEASUREMENTS AVERAGE E/E' RATIO: 5.29
BH CV STRESS BP STAGE 1: NORMAL
BH CV STRESS BP STAGE 2: NORMAL
BH CV STRESS BP STAGE 3: NORMAL
BH CV STRESS DURATION MIN STAGE 1: 3
BH CV STRESS DURATION MIN STAGE 2: 3
BH CV STRESS DURATION MIN STAGE 3: 3
BH CV STRESS DURATION MIN STAGE 4: 3
BH CV STRESS DURATION MIN STAGE 5: 0
BH CV STRESS DURATION SEC STAGE 1: 0
BH CV STRESS DURATION SEC STAGE 2: 0
BH CV STRESS DURATION SEC STAGE 3: 0
BH CV STRESS DURATION SEC STAGE 4: 0
BH CV STRESS DURATION SEC STAGE 5: 7
BH CV STRESS GRADE STAGE 1: 10
BH CV STRESS GRADE STAGE 2: 12
BH CV STRESS GRADE STAGE 3: 14
BH CV STRESS GRADE STAGE 4: 16
BH CV STRESS GRADE STAGE 5: 18
BH CV STRESS HR STAGE 1: 122
BH CV STRESS HR STAGE 2: 142
BH CV STRESS HR STAGE 3: 169
BH CV STRESS HR STAGE 4: 179
BH CV STRESS HR STAGE 5: 176
BH CV STRESS METS STAGE 1: 5
BH CV STRESS METS STAGE 2: 7.5
BH CV STRESS METS STAGE 3: 10
BH CV STRESS METS STAGE 4: 13.5
BH CV STRESS METS STAGE 5: 15
BH CV STRESS PROTOCOL 1: NORMAL
BH CV STRESS RECOVERY BP: NORMAL MMHG
BH CV STRESS RECOVERY HR: 100 BPM
BH CV STRESS SPEED STAGE 1: 1.7
BH CV STRESS SPEED STAGE 2: 2.5
BH CV STRESS SPEED STAGE 3: 3.4
BH CV STRESS SPEED STAGE 4: 4.2
BH CV STRESS SPEED STAGE 5: 5
BH CV STRESS STAGE 1: 1
BH CV STRESS STAGE 2: 2
BH CV STRESS STAGE 3: 3
BH CV STRESS STAGE 4: 4
BH CV STRESS STAGE 5: 5
BH CV VAS BP RIGHT ARM: NORMAL MMHG
BH CV XLRA - RV BASE: 3.4 CM
BH CV XLRA - RV LENGTH: 6.3 CM
BH CV XLRA - RV MID: 2.6 CM
BH CV XLRA - TDI S': 8.3 CM/SEC
LV EF 2D ECHO EST: 65 %
MAXIMAL PREDICTED HEART RATE: 162 BPM
MAXIMAL PREDICTED HEART RATE: 162 BPM
PERCENT MAX PREDICTED HR: 108.64 %
STRESS BASELINE BP: NORMAL MMHG
STRESS BASELINE HR: 81 BPM
STRESS PERCENT HR: 128 %
STRESS POST ESTIMATED WORKLOAD: 13.6 METS
STRESS POST EXERCISE DUR MIN: 12 MIN
STRESS POST EXERCISE DUR SEC: 7 SEC
STRESS POST PEAK BP: NORMAL MMHG
STRESS POST PEAK HR: 176 BPM
STRESS TARGET HR: 138 BPM
STRESS TARGET HR: 138 BPM

## 2019-05-21 PROCEDURE — 93306 TTE W/DOPPLER COMPLETE: CPT | Performed by: INTERNAL MEDICINE

## 2019-05-21 PROCEDURE — 93306 TTE W/DOPPLER COMPLETE: CPT

## 2019-05-21 PROCEDURE — 93017 CV STRESS TEST TRACING ONLY: CPT

## 2019-05-21 PROCEDURE — 93018 CV STRESS TEST I&R ONLY: CPT | Performed by: INTERNAL MEDICINE

## 2019-07-15 ENCOUNTER — TELEPHONE (OUTPATIENT)
Dept: INTERNAL MEDICINE | Facility: CLINIC | Age: 59
End: 2019-07-15

## 2019-07-15 DIAGNOSIS — E05.90 HYPERTHYROIDISM: Primary | ICD-10-CM

## 2019-07-15 NOTE — TELEPHONE ENCOUNTER
PT NEEDS A LAB ORDER TO COME IN FOR LABS SHE SAID DR [address]RE[address] DECREASED HER TAPAZOLE AND WANTED HER TO DO LABS-PT WANTED TO COME IN THIS WEEK    PTS NUMBER IF YOU HAVE QUESTIONS [phone]

## 2019-07-22 ENCOUNTER — TELEPHONE (OUTPATIENT)
Dept: INTERNAL MEDICINE | Facility: CLINIC | Age: 59
End: 2019-07-22

## 2019-07-22 PROCEDURE — 84439 ASSAY OF FREE THYROXINE: CPT | Performed by: INTERNAL MEDICINE

## 2019-07-22 PROCEDURE — 84443 ASSAY THYROID STIM HORMONE: CPT | Performed by: INTERNAL MEDICINE

## 2019-07-23 LAB
T4 FREE SERPL-MCNC: 1.1 NG/DL (ref 0.93–1.7)
TSH SERPL DL<=0.05 MIU/L-ACNC: 2.25 MIU/ML (ref 0.27–4.2)

## 2019-09-18 ENCOUNTER — TELEPHONE (OUTPATIENT)
Dept: INTERNAL MEDICINE | Facility: CLINIC | Age: 59
End: 2019-09-18

## 2019-09-18 DIAGNOSIS — E05.90 HYPERTHYROIDISM: Primary | ICD-10-CM

## 2019-09-18 NOTE — TELEPHONE ENCOUNTER
PATIENT WOULD LIKE TO KNOW WHEN SHE WILL BE GETTING LAB WORK DONE THAT WAY YOU ALL CAN FIGURE OUT IF SHE CAN LOWER HER DOSE ON HER TAPAZOLE. SHE WOULD ALSO LIKE TO KNOW IF SHE WILL BE ABLE TO NOT TAKE IT AT ALL IN THE FUTURE. [phone]

## 2019-09-20 RX ORDER — DOCUSATE SODIUM 250 MG
CAPSULE ORAL
COMMUNITY
End: 2019-11-19

## 2019-09-20 NOTE — TELEPHONE ENCOUNTER
t4 and tsh along with TSI lab work ordered  She should be seen in 10/19 or 11/19 for follow up as well.  Thanks, marleen

## 2019-11-12 LAB
T4 FREE SERPL-MCNC: 1.15 NG/DL (ref 0.93–1.7)
TSH SERPL DL<=0.05 MIU/L-ACNC: 2.12 UIU/ML (ref 0.27–4.2)

## 2019-11-12 PROCEDURE — 84445 ASSAY OF TSI GLOBULIN: CPT | Performed by: INTERNAL MEDICINE

## 2019-11-12 PROCEDURE — 84439 ASSAY OF FREE THYROXINE: CPT | Performed by: INTERNAL MEDICINE

## 2019-11-12 PROCEDURE — 84443 ASSAY THYROID STIM HORMONE: CPT | Performed by: INTERNAL MEDICINE

## 2019-11-15 LAB — TSI SER-MCNC: <0.1 IU/L (ref 0–0.55)

## 2019-11-19 ENCOUNTER — OFFICE VISIT (OUTPATIENT)
Dept: ENDOCRINOLOGY | Facility: CLINIC | Age: 59
End: 2019-11-19

## 2019-11-19 VITALS
DIASTOLIC BLOOD PRESSURE: 40 MMHG | WEIGHT: 137 LBS | TEMPERATURE: 98.1 F | HEIGHT: 65 IN | OXYGEN SATURATION: 98 % | SYSTOLIC BLOOD PRESSURE: 106 MMHG | HEART RATE: 68 BPM | BODY MASS INDEX: 22.82 KG/M2

## 2019-11-19 DIAGNOSIS — E05.90 HYPERTHYROIDISM: Primary | ICD-10-CM

## 2019-11-19 DIAGNOSIS — J02.9 PHARYNGITIS, UNSPECIFIED ETIOLOGY: ICD-10-CM

## 2019-11-19 PROCEDURE — 90471 IMMUNIZATION ADMIN: CPT | Performed by: INTERNAL MEDICINE

## 2019-11-19 PROCEDURE — 90686 IIV4 VACC NO PRSV 0.5 ML IM: CPT | Performed by: INTERNAL MEDICINE

## 2019-11-19 PROCEDURE — 99213 OFFICE O/P EST LOW 20 MIN: CPT | Performed by: INTERNAL MEDICINE

## 2019-11-19 RX ORDER — CEFDINIR 300 MG/1
300 CAPSULE ORAL 2 TIMES DAILY
Qty: 20 CAPSULE | Refills: 0 | Status: SHIPPED | OUTPATIENT
Start: 2019-11-19 | End: 2020-02-04

## 2019-11-19 NOTE — PROGRESS NOTES
Falguni Lopez 59 y.o.  CC:Follow-up; Hyperthyroidism; and Sore Throat    [place]: Follow-up; Hyperthyroidism; and Sore Throat    Is on methimazole 1/2 pill 3 days a week   tfts normal  tsi normal as well   Discussed discontinuing methimazole for now   Having some weight loss  Also sore throat with left ear pain     Allergies   Allergen Reactions   • Erythromycin Hives and Rash       Current Outpatient Medications:   •  Calcium Carbonate-Vitamin D (CALCIUM 500+D HIGH POTENCY) 500-400 MG-UNIT per tablet, Take 1 tablet by mouth., Disp: , Rfl:   •  Cholecalciferol (VITAMIN D) 1000 UNITS tablet, Take 1 tablet by mouth daily., Disp: , Rfl:   •  docusate sodium (COLACE) 250 MG capsule, Take 250 mg by mouth 2 (two) times a day., Disp: , Rfl:   •  methylcellulose (CITRUCEL) oral powder, Citrucel (sucrose) oral powder  Daily, Disp: , Rfl:   •  Multiple Vitamins-Minerals (CENTRUM PO), Take 1 tablet by mouth daily., Disp: , Rfl:   •  polyethylene glycol (MIRALAX) packet, Take 17 g by mouth Daily., Disp: , Rfl:   •  TAPAZOLE 5 MG tablet, Take 1/2 tablet PO daily (Patient taking differently: Take 1/2 tablet PO 3 days per week), Disp: 15 tablet, Rfl: 5  Patient Active Problem List    Diagnosis   • Chest tightness [R07.89]   • Hair loss [L65.9]   • WINDY (stress urinary incontinence, female) [N39.3]   • Rectocele [N81.6]   • Menopause [Z78.0]   • Graves disease [E05.00]   • Fibrocystic breast changes, bilateral [N60.11, N60.12]   • Family history of breast cancer in mother [Z80.3]   • Chronic constipation [K59.09]   • Anemia [D64.9]   • Loss of hair [L65.9]   • Hyperthyroidism [E05.90]     Review of Systems   Constitutional: Negative for activity change, appetite change, chills, diaphoresis, fatigue, fever and unexpected weight change.   HENT: Positive for ear pain and sore throat. Negative for congestion, dental problem, drooling, ear discharge, facial swelling, hearing loss, mouth sores, nosebleeds, postnasal drip, rhinorrhea,  sinus pressure, sneezing, tinnitus, trouble swallowing and voice change.    Eyes: Negative for photophobia, pain, discharge, redness, itching and visual disturbance.   Respiratory: Negative for apnea, cough, choking, chest tightness, shortness of breath, wheezing and stridor.    Cardiovascular: Negative for chest pain, palpitations and leg swelling.   Gastrointestinal: Negative for abdominal distention, abdominal pain, anal bleeding, blood in stool, constipation, diarrhea, nausea, rectal pain and vomiting.   Endocrine: Negative for cold intolerance, heat intolerance, polydipsia, polyphagia and polyuria.   Genitourinary: Negative for decreased urine volume, difficulty urinating, dysuria, enuresis, flank pain, frequency, genital sores, hematuria and urgency.   Musculoskeletal: Negative for arthralgias, back pain, gait problem, joint swelling, myalgias, neck pain and neck stiffness.   Skin: Negative for color change, pallor, rash and wound.   Allergic/Immunologic: Negative for environmental allergies, food allergies and immunocompromised state.   Neurological: Negative for dizziness, tremors, seizures, syncope, facial asymmetry, speech difficulty, weakness, light-headedness, numbness and headaches.   Hematological: Negative for adenopathy. Does not bruise/bleed easily.   Psychiatric/Behavioral: Negative for agitation, behavioral problems, confusion, decreased concentration, dysphoric mood, hallucinations, self-injury, sleep disturbance and suicidal ideas. The patient is not nervous/anxious and is not hyperactive.      Social History     Socioeconomic History   • Marital status:      Spouse name: Not on file   • Number of children: Not on file   • Years of education: Not on file   • Highest education level: Not on file   Tobacco Use   • Smoking status: Never Smoker   • Smokeless tobacco: Never Used   Substance and Sexual Activity   • Alcohol use: No   • Drug use: No   • Sexual activity: Yes     Partners: Male      "Birth control/protection: Surgical, Post-menopausal     Family History   Problem Relation Age of Onset   • Arrhythmia Mother    • Cancer Mother    • Heart attack Father    • Diabetes type II Father    • Thyroid disease Father      /40   Pulse 68   Ht 165.1 cm (65\")   Wt 62.1 kg (137 lb)   LMP  (LMP Unknown)   SpO2 98%   BMI 22.80 kg/m²   Physical Exam   Constitutional: She is oriented to person, place, and time. She appears well-developed and well-nourished.   HENT:   Head: Normocephalic and atraumatic.   Nose: Nose normal.   Mouth/Throat: Oropharynx is clear and moist.   Red left tm - red post pharynx    Eyes: Conjunctivae, EOM and lids are normal. Pupils are equal, round, and reactive to light.   Neck: Trachea normal and normal range of motion. Neck supple. Carotid bruit is not present. No tracheal deviation present. No thyroid mass and no thyromegaly present.   Cardiovascular: Normal rate, regular rhythm, normal heart sounds and intact distal pulses. Exam reveals no gallop and no friction rub.   No murmur heard.  Pulmonary/Chest: Effort normal and breath sounds normal. No respiratory distress. She has no wheezes.   Musculoskeletal: Normal range of motion. She exhibits no edema or deformity.   Lymphadenopathy:     She has no cervical adenopathy.   Neurological: She is alert and oriented to person, place, and time. She has normal reflexes. She displays normal reflexes. No cranial nerve deficit.   Skin: Skin is warm and dry. No rash noted. No cyanosis or erythema. Nails show no clubbing.   Psychiatric: She has a normal mood and affect. Her speech is normal and behavior is normal. Judgment and thought content normal. Cognition and memory are normal.   Nursing note and vitals reviewed.    Results for orders placed or performed in visit on 09/18/19   T4, Free   Result Value Ref Range    Free T4 1.15 0.93 - 1.70 ng/dL   TSH   Result Value Ref Range    TSH 2.120 0.270 - 4.200 uIU/mL   Thyroid Stimulating " Immunoglobulin   Result Value Ref Range    Thyroid Stimulating Immunoglobulin <0.10 0.00 - 0.55 IU/L     Problem List Items Addressed This Visit        Respiratory    Pharyngitis     rx sent for omnicef   Monitor symptoms             Endocrine    Hyperthyroidism - Primary     Reviewed tfts and tsi- ok to stop methimazole   F/u yearly   Lab prn              Graves in remission  Flu vaccine today  Return in about 1 year (around 11/19/2020) for Recheck 30 min .    Felicity Walsh MA  Signed Karie Whitney MD

## 2019-12-18 ENCOUNTER — TELEPHONE (OUTPATIENT)
Dept: OBSTETRICS AND GYNECOLOGY | Facility: CLINIC | Age: 59
End: 2019-12-18

## 2019-12-18 NOTE — TELEPHONE ENCOUNTER
Falguni had a question regarding the use of birth control pills and valtrex.  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

## 2020-02-04 ENCOUNTER — TELEPHONE (OUTPATIENT)
Dept: ENDOCRINOLOGY | Facility: CLINIC | Age: 60
End: 2020-02-04

## 2020-02-04 ENCOUNTER — OFFICE VISIT (OUTPATIENT)
Dept: OBSTETRICS AND GYNECOLOGY | Facility: CLINIC | Age: 60
End: 2020-02-04

## 2020-02-04 VITALS
WEIGHT: 137.6 LBS | DIASTOLIC BLOOD PRESSURE: 78 MMHG | SYSTOLIC BLOOD PRESSURE: 110 MMHG | BODY MASS INDEX: 22.92 KG/M2 | HEIGHT: 65 IN

## 2020-02-04 DIAGNOSIS — Z80.3 FAMILY HISTORY OF BREAST CANCER IN MOTHER: ICD-10-CM

## 2020-02-04 DIAGNOSIS — N60.12 FIBROCYSTIC BREAST CHANGES, BILATERAL: Primary | ICD-10-CM

## 2020-02-04 DIAGNOSIS — Z01.419 ENCOUNTER FOR GYNECOLOGICAL EXAMINATION WITHOUT ABNORMAL FINDING: ICD-10-CM

## 2020-02-04 DIAGNOSIS — E05.90 HYPERTHYROIDISM: Primary | ICD-10-CM

## 2020-02-04 DIAGNOSIS — N60.11 FIBROCYSTIC BREAST CHANGES, BILATERAL: Primary | ICD-10-CM

## 2020-02-04 PROCEDURE — 99396 PREV VISIT EST AGE 40-64: CPT | Performed by: OBSTETRICS & GYNECOLOGY

## 2020-02-04 NOTE — PROGRESS NOTES
Chief Complaint   Patient presents with   • Gynecologic Exam       Falguni Alonzo is a 59 y.o. year old  presenting to be seen for her annual exam.  This patient has previously had tubal sterilization.  I did a laparoscopically assisted vaginal hysterectomy/vaginal cuff suspension to uterosacral ligament/anterior and posterior repair.  She has had excellent results.  She has occasional urinary urgency but is leaking urine.  She has a history of chronic constipation and uses stool softeners.  She has a history of use disease, but is currently off medication.    SCREENING TESTS    Year 2012 2016 2017   Age                         PAP                         HPV high risk                         Mammogram        benign benign                NANCY score                         Breast MRI                         Lipids                         Vitamin D                         Colonoscopy                         DEXA  Frax (hip/any)                         Ovarian Screen                             She exercises regularly: yes.  She wears her seat belt: yes.  She has concerns about domestic violence: no.  She has noticed changes in height: no    GYN screening history:  · Last mammogram: was done on approximately 2020 and the result was: Birads II (Benign findings)..    No Additional Complaints Reported    The following portions of the patient's history were reviewed and updated as appropriate:vital signs and   She  has a past medical history of Anemia, Chronic constipation, Family history of breast cancer in mother, Fibrocystic breast changes, bilateral, Graves disease, Hair loss, Hyperthyroidism, Menopause, Rectocele, and WINDY (stress urinary incontinence, female).  She does not have any pertinent problems on file.  She  has a past surgical history that includes Tonsillectomy; Tonsillectomy; Tubal ligation; and  "laparoscopic assisted vaginal hysterectomy, anterior and posterior vaginal repair.  Her family history includes Arrhythmia in her mother; Cancer in her mother; Diabetes type II in her father; Heart attack in her father; Thyroid disease in her father.  She  reports that she has never smoked. She has never used smokeless tobacco. She reports that she does not drink alcohol or use drugs.  Current Outpatient Medications   Medication Sig Dispense Refill   • Calcium Carbonate-Vitamin D (CALCIUM 500+D HIGH POTENCY) 500-400 MG-UNIT per tablet Take 1 tablet by mouth.     • Cholecalciferol (VITAMIN D) 1000 UNITS tablet Take 1 tablet by mouth daily.     • docusate sodium (COLACE) 250 MG capsule Take 250 mg by mouth 2 (two) times a day.     • methylcellulose (CITRUCEL) oral powder Citrucel (sucrose) oral powder   Daily     • Multiple Vitamins-Minerals (CENTRUM PO) Take 1 tablet by mouth daily.     • polyethylene glycol (MIRALAX) packet Take 17 g by mouth Daily.       No current facility-administered medications for this visit.      She is allergic to erythromycin..    Review of Systems  A comprehensive review of systems was taken.  Constitutional: negative for fever, chills, activity change, appetite change, fatigue and unexpected weight change.  Respiratory: negative  Cardiovascular: negative  Gastrointestinal: positive for constipation  Genitourinary:positive for occasional urgency  Musculoskeletal:negative  Behavioral/Psych: negative       /78   Ht 165.1 cm (65\")   Wt 62.4 kg (137 lb 9.6 oz)   LMP  (LMP Unknown)   Breastfeeding No   BMI 22.90 kg/m²     Physical Exam    General:  alert; cooperative; well developed; well nourished   Skin:  No suspicious lesions seen   Thyroid: normal to inspection and palpation   Lungs:  clear to auscultation bilaterally   Heart:  regular rate and rhythm, S1, S2 normal, no murmur, click, rub or gallop   Breasts:  Examined in supine position  Symmetric without masses or skin " dimpling  Nipples normal without inversion, lesions or discharge  There are no palpable axillary nodes  Fibrocystic changes are present both breasts without a discrete mass   Abdomen: soft, non-tender; no masses  no umbilical or inguinal hernias are present  no hepato-splenomegaly   Pelvis: Clinical staff was present for exam  External genitalia:  normal appearance of the external genitalia including Bartholin's and Richardton's glands.  Vaginal:  normal pink mucosa without prolapse or lesions.  Cervix:  absent.  Uterus:  absent.  Adnexa:  non palpable bilaterally.  Rectal:  anus visually normal appearing. recto-vaginal exam unremarkable and confirms findings;     Lab Review   CBC results and CMP results    Imaging  Mammogram results- Birads II         ASSESSMENT  Problems Addressed this Visit        Other    Fibrocystic breast changes, bilateral - Primary    Family history of breast cancer in mother      Other Visit Diagnoses     Encounter for gynecological examination without abnormal finding                  Substance History:   reports that she has never smoked. She has never used smokeless tobacco.   reports that she does not drink alcohol.   reports that she does not use drugs.    Substance use counseling is not indicated based on patient history.      PLAN    · Medications prescribed this encounter:  No orders of the defined types were placed in this encounter.  · Monthly self breast assessment and annual breast imaging  · Calcium, 600 mg/ Vit. D, 400 IU daily; regular weight-bearing exercise  · Follow up: 12 month(s)  *Please note that portions of this documentation may have been completed with a voice recognition program.  Efforts were made to edit this dictation, but occasional words may have been mistranscribed.       This note was electronically signed.    CHEY Hayward MD  February 4, 2020  11:00 AM

## 2020-02-04 NOTE — TELEPHONE ENCOUNTER
PATIENT WAS TOLD TO COME IN AND HAVE BLOOD DRAWN THIS MONTH. SHE NEEDS LAB ORDERS PLACED IN CHART TO HAVE LABS DRAWN. PATIENTS NUMBER [phone]

## 2020-02-05 PROCEDURE — 84443 ASSAY THYROID STIM HORMONE: CPT | Performed by: INTERNAL MEDICINE

## 2020-02-05 PROCEDURE — 84439 ASSAY OF FREE THYROXINE: CPT | Performed by: INTERNAL MEDICINE

## 2020-02-06 LAB
T4 FREE SERPL-MCNC: 1.06 NG/DL (ref 0.93–1.7)
TSH SERPL DL<=0.05 MIU/L-ACNC: 1.6 UIU/ML (ref 0.27–4.2)

## 2020-12-21 ENCOUNTER — TRANSCRIBE ORDERS (OUTPATIENT)
Dept: ADMINISTRATIVE | Facility: HOSPITAL | Age: 60
End: 2020-12-21

## 2020-12-21 DIAGNOSIS — Z12.31 VISIT FOR SCREENING MAMMOGRAM: Primary | ICD-10-CM

## 2021-01-05 ENCOUNTER — TELEPHONE (OUTPATIENT)
Dept: OBSTETRICS AND GYNECOLOGY | Facility: CLINIC | Age: 61
End: 2021-01-05

## 2021-01-05 NOTE — TELEPHONE ENCOUNTER
Falguni has a daughter of child bearing age.  She is not pregnant, nor is she trying to get pregnant.  Falguni wonders if it is OK for her to get the COVID vaccine?  I told her that I looked up information about the vaccine before calling her and that no studies have been done in pregnant women, so there is no information regarding that patient type.

## 2021-01-05 NOTE — TELEPHONE ENCOUNTER
PT CALLING ASKING ABOUT COVID VACCINE -ABOUT CHILD BEARING AGE CHILDREN MAINLY HER DAUGHTER PLEASE CALL HER

## 2021-02-09 ENCOUNTER — OFFICE VISIT (OUTPATIENT)
Dept: OBSTETRICS AND GYNECOLOGY | Facility: CLINIC | Age: 61
End: 2021-02-09

## 2021-02-09 VITALS
WEIGHT: 136.6 LBS | HEIGHT: 65 IN | SYSTOLIC BLOOD PRESSURE: 132 MMHG | DIASTOLIC BLOOD PRESSURE: 76 MMHG | BODY MASS INDEX: 22.76 KG/M2

## 2021-02-09 DIAGNOSIS — N60.12 FIBROCYSTIC BREAST CHANGES, BILATERAL: ICD-10-CM

## 2021-02-09 DIAGNOSIS — Z01.419 ENCOUNTER FOR GYNECOLOGICAL EXAMINATION WITHOUT ABNORMAL FINDING: ICD-10-CM

## 2021-02-09 DIAGNOSIS — N81.6 RECTOCELE: ICD-10-CM

## 2021-02-09 DIAGNOSIS — N60.11 FIBROCYSTIC BREAST CHANGES, BILATERAL: ICD-10-CM

## 2021-02-09 DIAGNOSIS — Z78.0 MENOPAUSE: Primary | ICD-10-CM

## 2021-02-09 PROCEDURE — 99396 PREV VISIT EST AGE 40-64: CPT | Performed by: OBSTETRICS & GYNECOLOGY

## 2021-02-09 RX ORDER — MELOXICAM 15 MG/1
1 TABLET ORAL AS NEEDED
COMMUNITY
Start: 2020-12-22 | End: 2021-04-26

## 2021-02-09 RX ORDER — MINOXIDIL 2 %
SOLUTION, NON-ORAL TOPICAL
COMMUNITY

## 2021-02-09 NOTE — PROGRESS NOTES
Chief Complaint   Patient presents with   • Annual Exam     patient feels that she may not be emptying her bladder completely.       Falguni Alonzo is a 60 y.o. year old  presenting to be seen for her annual exam.  This patient has previously had tubal sterilization and in  I did a laparoscopically assisted vaginal hysterectomy/vaginal vault suspension to uterosacral ligament/anterior-posterior repair.  This was done for uterine prolapse and intramural uterine leiomyomata.  She has a slight rectocele which is asymptomatic.  She does not use estrogen replacement therapy and denies menopausal symptoms.  She is no longer taking methimazole.  She indicates that at times she does not completely empty her bladder.  She denies bowel symptoms.  She is a retired pharmacist.  SCREENING TESTS    Year 2012   Age                         PAP     Neg.                    HPV high risk                         Mammogram        benign benign                NANCY score                         Breast MRI                         Lipids                         Vitamin D                         Colonoscopy                         DEXA  Frax (hip/any)                         Ovarian Screen                             She exercises regularly: yes.  She wears her seat belt: yes.  She has concerns about domestic violence: no.  She has noticed changes in height: no    GYN screening history:  · Last pap: was done on approximately 2016 and the result was: normal PAP.  · Last mammogram: was done on approximately 2020 and the result was: Birads II (Benign findings)..    No Additional Complaints Reported    The following portions of the patient's history were reviewed and updated as appropriate:vital signs and   She  has a past medical history of Anemia, Chronic constipation, Family history of breast cancer in mother,  Fibrocystic breast changes, bilateral, Graves disease, Hair loss, Hyperthyroidism, Menopause, Rectocele, and WINDY (stress urinary incontinence, female).  She does not have any pertinent problems on file.  She  has a past surgical history that includes Tonsillectomy; Tonsillectomy; Tubal ligation; and laparoscopic assisted vaginal hysterectomy, anterior and posterior vaginal repair.  Her family history includes Arrhythmia in her mother; Cancer in her mother; Diabetes type II in her father; Heart attack in her father; Thyroid disease in her father.  She  reports that she has never smoked. She has never used smokeless tobacco. She reports that she does not drink alcohol or use drugs.  Current Outpatient Medications   Medication Sig Dispense Refill   • Vitamins-Lipotropics (LIPOFLAVONOID PO) Take  by mouth.     • Calcium Carbonate-Vitamin D (CALCIUM 500+D HIGH POTENCY) 500-400 MG-UNIT per tablet Take 1 tablet by mouth.     • Cholecalciferol (VITAMIN D) 1000 UNITS tablet Take 1 tablet by mouth daily.     • docusate sodium (COLACE) 250 MG capsule Take 250 mg by mouth 2 (two) times a day.     • meloxicam (MOBIC) 15 MG tablet Take 1 tablet by mouth As Needed.     • methylcellulose (CITRUCEL) oral powder Citrucel (sucrose) oral powder   Daily     • Minoxidil (Rogaine Womens) 5 % foam Rogaine 5 % topical foam   Every day     • Multiple Vitamins-Minerals (CENTRUM PO) Take 1 tablet by mouth daily.     • polyethylene glycol (MIRALAX) packet Take 17 g by mouth Daily.       No current facility-administered medications for this visit.      She is allergic to erythromycin..    Review of Systems  A review of systems was taken.  She denies cough, fever, shortness of breath, and loss of her sense of taste or smell  Constitutional: negative for fever, chills, activity change, appetite change, fatigue and unexpected weight change.  Respiratory: negative  Cardiovascular: negative  Gastrointestinal:  "negative  Genitourinary:negative  Musculoskeletal:negative  Behavioral/Psych: negative     Counseling/Anticipatory Guidance Discussed: nutrition, physical activity, healthy weight, injury prevention, immunizations, screenings and self-breast exam      /76   Ht 165.1 cm (65\")   Wt 62 kg (136 lb 9.6 oz)   LMP  (LMP Unknown)   Breastfeeding No   BMI 22.73 kg/m²     MEDICALLY INDICATED   Physical Exam    General:  alert; cooperative; well developed; well nourished   Skin:  No suspicious lesions seen   Thyroid: normal to inspection and palpation   Lungs:  breathing is unlabored  clear to auscultation bilaterally   Heart:  regular rate and rhythm, S1, S2 normal, no murmur, click, rub or gallop  normal apical impulse   Breasts:  Examined in supine position  Symmetric without masses or skin dimpling  Nipples normal without inversion, lesions or discharge  There are no palpable axillary nodes  Fibrocystic changes are present both breasts without a discrete mass   Abdomen: soft, non-tender; no masses  no umbilical or inguinal hernias are present  no hepato-splenomegaly   Pelvis: Clinical staff was present for exam  External genitalia:  normal appearance of the external genitalia including Bartholin's and Hodges's glands.  Vaginal:  normal pink mucosa without prolapse or lesions. Minimal rectocele  Cervix:  absent.  Uterus:  absent.  Adnexa:  non palpable bilaterally.  Rectal:  anus visually normal appearing. recto-vaginal exam unremarkable and confirms findings;     Lab Review   CBC results, CMP results and TSH results    Imaging  Mammogram results        Advance directives- YES      ASSESSMENT  Problems Addressed this Visit        Gastrointestinal Abdominal     Rectocele       Genitourinary and Reproductive     Menopause - Primary    Fibrocystic breast changes, bilateral      Other Visit Diagnoses     Encounter for gynecological examination without abnormal finding          Diagnoses       Codes Comments    " Menopause    -  Primary ICD-10-CM: Z78.0  ICD-9-CM: 627.2     Fibrocystic breast changes, bilateral     ICD-10-CM: N60.11, N60.12  ICD-9-CM: 610.1     Encounter for gynecological examination without abnormal finding     ICD-10-CM: Z01.419  ICD-9-CM: V72.31     Rectocele     ICD-10-CM: N81.6  ICD-9-CM: 618.04               Substance History:   reports that she has never smoked. She has never used smokeless tobacco.   reports no history of alcohol use.   reports no history of drug use.    Substance use counseling is not indicated based on patient history.      PLAN    · Medications prescribed this encounter:  No orders of the defined types were placed in this encounter.  · Monthly self breast assessment and annual breast imaging  · I have instructed the patient to lean forward after she completes voiding to completely empty her bladder  · Calcium, 600 mg/ Vit. D, 400 IU daily; regular weight-bearing exercise  · Follow up: 12 month(s)  *Please note that portions of this documentation may have been completed with a voice recognition program.  Efforts were made to edit this dictation, but occasional words may have been mistranscribed.       This note was electronically signed.    CHEY Hayward MD  February 9, 2021  10:53 EST

## 2021-02-23 ENCOUNTER — OFFICE VISIT (OUTPATIENT)
Dept: ENDOCRINOLOGY | Facility: CLINIC | Age: 61
End: 2021-02-23

## 2021-02-23 VITALS
DIASTOLIC BLOOD PRESSURE: 60 MMHG | BODY MASS INDEX: 22.66 KG/M2 | HEART RATE: 70 BPM | SYSTOLIC BLOOD PRESSURE: 102 MMHG | HEIGHT: 65 IN | WEIGHT: 136 LBS | OXYGEN SATURATION: 99 %

## 2021-02-23 DIAGNOSIS — D64.9 ANEMIA, UNSPECIFIED TYPE: ICD-10-CM

## 2021-02-23 DIAGNOSIS — E05.90 HYPERTHYROIDISM: Primary | ICD-10-CM

## 2021-02-23 DIAGNOSIS — B35.1 ONYCHOMYCOSIS: ICD-10-CM

## 2021-02-23 PROCEDURE — 99214 OFFICE O/P EST MOD 30 MIN: CPT | Performed by: INTERNAL MEDICINE

## 2021-02-23 RX ORDER — TERBINAFINE HYDROCHLORIDE 250 MG/1
250 TABLET ORAL DAILY
Qty: 56 TABLET | Refills: 0 | Status: SHIPPED | OUTPATIENT
Start: 2021-02-23 | End: 2021-04-20

## 2021-02-23 NOTE — PROGRESS NOTES
Falguni Alonzo 60 y.o.  [facility]:Follow-up, Hyperthyroidism (off meds), and fingernail fungus      [place]: Follow-up, Hyperthyroidism (off meds), and fingernail fungus    Doing well overall - bp is good   No c/o   Is on vitamin D supplement  Some weight loss- on purpose  Some tremor- had 2 cups of coffee this morning  Would like lamisil for fingernail fungus  Tried fluconazole and it did not work     Allergies   Allergen Reactions   • Erythromycin Hives and Rash       Current Outpatient Medications:   •  Calcium Carbonate-Vitamin D (CALCIUM 500+D HIGH POTENCY) 500-400 MG-UNIT per tablet, Take 1 tablet by mouth., Disp: , Rfl:   •  Cholecalciferol (VITAMIN D) 1000 UNITS tablet, Take 1 tablet by mouth daily., Disp: , Rfl:   •  docusate sodium (COLACE) 250 MG capsule, Take 250 mg by mouth 2 (two) times a day., Disp: , Rfl:   •  meloxicam (MOBIC) 15 MG tablet, Take 1 tablet by mouth As Needed., Disp: , Rfl:   •  methylcellulose (CITRUCEL) oral powder, Citrucel (sucrose) oral powder  Daily, Disp: , Rfl:   •  Minoxidil (Rogaine Womens) 5 % foam, Rogaine 5 % topical foam  Every day, Disp: , Rfl:   •  Multiple Vitamins-Minerals (CENTRUM PO), Take 1 tablet by mouth daily., Disp: , Rfl:   •  polyethylene glycol (MIRALAX) packet, Take 17 g by mouth Daily., Disp: , Rfl:   •  Vitamins-Lipotropics (LIPOFLAVONOID PO), Take  by mouth., Disp: , Rfl:   Patient Active Problem List    Diagnosis   • Pharyngitis [J02.9]   • Chest tightness [R07.89]   • Hair loss [L65.9]   • WINDY (stress urinary incontinence, female) [N39.3]   • Rectocele [N81.6]   • Menopause [Z78.0]   • Graves disease [E05.00]   • Fibrocystic breast changes, bilateral [N60.11, N60.12]   • Family history of breast cancer in mother [Z80.3]   • Chronic constipation [K59.09]   • Anemia [D64.9]   • Loss of hair [L65.9]   • Hyperthyroidism [E05.90]     Review of Systems   Constitutional: Positive for unexpected weight change. Negative for activity change and appetite change.  "  HENT: Negative for congestion and rhinorrhea.    Eyes: Negative for visual disturbance.   Respiratory: Negative for cough and shortness of breath.    Cardiovascular: Negative for palpitations and leg swelling.   Gastrointestinal: Negative for constipation, diarrhea and nausea.   Genitourinary: Negative for hematuria.   Musculoskeletal: Negative for arthralgias, back pain, gait problem, joint swelling and myalgias.   Skin: Negative for color change, rash and wound.   Allergic/Immunologic: Negative for environmental allergies, food allergies and immunocompromised state.   Neurological: Positive for tremors. Negative for dizziness, weakness and light-headedness.   Psychiatric/Behavioral: Negative for confusion, decreased concentration, dysphoric mood and sleep disturbance. The patient is not nervous/anxious.      Social History     Socioeconomic History   • Marital status:      Spouse name: Not on file   • Number of children: Not on file   • Years of education: Not on file   • Highest education level: Not on file   Tobacco Use   • Smoking status: Never Smoker   • Smokeless tobacco: Never Used   Substance and Sexual Activity   • Alcohol use: No   • Drug use: No   • Sexual activity: Yes     Partners: Male     Birth control/protection: Surgical, Post-menopausal     Family History   Problem Relation Age of Onset   • Arrhythmia Mother    • Cancer Mother    • Heart attack Father    • Diabetes type II Father    • Thyroid disease Father      /60   Pulse 70   Ht 165.1 cm (65\")   Wt 61.7 kg (136 lb)   LMP  (LMP Unknown)   SpO2 99%   BMI 22.63 kg/m²   Physical Exam  Vitals signs and nursing note reviewed.   Constitutional:       Appearance: Normal appearance. She is well-developed.   HENT:      Head: Normocephalic and atraumatic.      Comments: No stare or lid lag   Eyes:      General: Lids are normal.      Extraocular Movements: Extraocular movements intact.      Conjunctiva/sclera: Conjunctivae normal.      " Pupils: Pupils are equal, round, and reactive to light.   Neck:      Musculoskeletal: Normal range of motion and neck supple.      Thyroid: No thyroid mass or thyromegaly.      Vascular: No carotid bruit.      Trachea: Trachea normal. No tracheal deviation.   Cardiovascular:      Rate and Rhythm: Normal rate and regular rhythm.      Heart sounds: Normal heart sounds. No murmur. No friction rub. No gallop.    Pulmonary:      Effort: Pulmonary effort is normal. No respiratory distress.      Breath sounds: Normal breath sounds. No wheezing.   Musculoskeletal: Normal range of motion.         General: No deformity.   Lymphadenopathy:      Cervical: No cervical adenopathy.   Skin:     General: Skin is warm and dry.      Findings: No erythema or rash.      Nails: There is no clubbing.        Comments: Mild fungal nail changes on hand    Neurological:      Mental Status: She is alert and oriented to person, place, and time.      Cranial Nerves: No cranial nerve deficit.      Deep Tendon Reflexes: Reflexes are normal and symmetric. Reflexes normal.      Comments: Mild tremor    Psychiatric:         Speech: Speech normal.         Behavior: Behavior normal.         Thought Content: Thought content normal.         Judgment: Judgment normal.       Results for orders placed or performed in visit on 02/04/20   T4, Free    Specimen: Blood   Result Value Ref Range    Free T4 1.06 0.93 - 1.70 ng/dL   TSH    Specimen: Blood   Result Value Ref Range    TSH 1.600 0.270 - 4.200 uIU/mL     Problems Addressed this Visit        Other    Onychomycosis     rx lamisil 250 mg daily          Relevant Medications    terbinafine (lamiSIL) 250 MG tablet    Hyperthyroidism - Primary     Repeat tfts, tsi   Some signs adrenergic excess          Relevant Orders    T4, Free    TSH    Thyroid Stimulating Immunoglobulin    Anemia     Check cbc            Diagnoses       Codes Comments    Hyperthyroidism    -  Primary ICD-10-CM: E05.90  ICD-9-CM: 242.90      Onychomycosis     ICD-10-CM: B35.1  ICD-9-CM: 110.1     Anemia, unspecified type     ICD-10-CM: D64.9  ICD-9-CM: 285.9         Return in about 1 year (around 2/23/2022) for Recheck.    Felicity Walsh MA  Signed Karie Whitney MD

## 2021-03-08 ENCOUNTER — APPOINTMENT (OUTPATIENT)
Dept: MAMMOGRAPHY | Facility: HOSPITAL | Age: 61
End: 2021-03-08

## 2021-03-10 ENCOUNTER — APPOINTMENT (OUTPATIENT)
Dept: MAMMOGRAPHY | Facility: HOSPITAL | Age: 61
End: 2021-03-10

## 2021-04-17 ENCOUNTER — APPOINTMENT (OUTPATIENT)
Dept: MAMMOGRAPHY | Facility: HOSPITAL | Age: 61
End: 2021-04-17

## 2021-04-21 PROBLEM — J02.9 PHARYNGITIS: Status: RESOLVED | Noted: 2019-11-19 | Resolved: 2021-04-21

## 2021-04-23 NOTE — PROGRESS NOTES
Chief Complaint   Patient presents with   • Establish Care   • Graves' Disease   • Constipation       History of Present Illness  60 y.o.  woman presents as a NEW PATIENT for establishing care. Is followed by Dr. Whitney for Graves disease, no longer on tapazole (stopped about 1-1/2 yrs ago). Is fairly healthy, physically active, and is retired. Enjoys spending time with her kids and 3 grandkids. Volunteers at [employer] as well.     Has chronic constipation but has a stable regimen with miralax and citrucel with occ DOS.    Developed mild right-sided tinnitus, took some lipoflavenoids (which didn't help much), and was dx'd with mild right hearing loss per Dr. Marx.    Has had nail changes at left thumb and index finger nails. Was prescribed 12 wks of fluconazole per Dr. Bianchi and then 2 wks of terbinafine per Marianne Whitney. The latter caused a metallic taste in mouth. Nail did not change much with either treatment. Does not have assoc'd pain. Only gets pedicures, not manicures.    Review of Systems  Denies visual changes (wears monovision contacts), CP, palpitations, SOB, abd pain, n/v. ROS (+) for chr but stable constipation. Denies vaginal bleeding. Has followed [employer] plan/concepts for years. All other ROS reviewed and negative.    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    ALLERGIES:  Allergies   Allergen Reactions   • Erythromycin Hives and Rash  "        Current Outpatient Medications:   •  Calcium Carbonate-Vitamin D) 500-400 MG-UNIT QD  •  Cholecalciferol (VITAMIN D) 1000 UNITS QD  •  docusate sodium (COLACE) 250 MG BID  •  meloxicam (MOBIC) 15 MG QD prn  •  methylcellulose (CITRUCEL) oral powder QD  •  Minoxidil (Rogaine Womens) 5 % foam AD  •  Multiple Vitamins-Minerals (CENTRUM PO) QD  •  polyethylene glycol (MIRALAX) packet, Take 17 g QD  •  Vitamins-Lipotropics (LIPOFLAVONOID PO) QD    VITALS:  /68   Pulse 68   Ht 165.1 cm (65\")   Wt 62.6 kg (138 lb)   LMP  (LMP Unknown)   SpO2 100%   BMI 22.96 kg/m²     Physical Exam  Vitals and nursing note reviewed.   Constitutional:       General: She is not in acute distress.     Appearance: Normal appearance.   Eyes:      Extraocular Movements: Extraocular movements intact.      Conjunctiva/sclera: Conjunctivae normal.   Pulmonary:      Effort: Pulmonary effort is normal. No respiratory distress.   Skin:     Comments: Mildly off-white irreg contour nails of left thumb and index finger without brittle changes, cracking, bleeding or surr erythema   Neurological:      Mental Status: She is alert and oriented to person, place, and time. Mental status is at baseline.      Gait: Gait normal.   Psychiatric:         Mood and Affect: Mood normal.         Behavior: Behavior normal.         LABS  Results for orders placed or performed in visit on 02/04/20   T4, Free    Specimen: Blood   Result Value Ref Range    Free T4 1.06 0.93 - 1.70 ng/dL   TSH    Specimen: Blood   Result Value Ref Range    TSH 1.600 0.270 - 4.200 uIU/mL         ASSESSMENT/PLAN    Diagnoses and all orders for this visit:    1. Menopause (Primary)  Assessment & Plan:  Discussed updating DEXA    Orders:  -     DEXA Bone Density Axial; Future    2. Fingernail problem  Comments:  unlikely onychomycosis with no response to both fluc and terbinafine; since not causing pain or other sxs, just followed clinically and keep nails trimmed    3. " Hyperthyroidism  Assessment & Plan:  Followed by Dr. Whitney; euthyroid 2/21 off of meds      4. Chronic constipation  Assessment & Plan:  Stable with regimen of miralax and citrucel QD with prn DOS      Time Documentation    Counseled patient  I spent 51 minutes face to face and 10 minutes non-face to face on today's office visit. Time was spent reviewing patient's previous notes, lab results, test results, vitals, and/or other records as well as examining the patient, providing counseling, coordinating care, answering questions, discussing evaluation and treatment plans, and writing this note.    Total time: 61 minutes        FOLLOW-UP  1. Health maintenance - flu recommended annually in the fall; Tdap 3/15 (next Td will be due 2025), rec HAV and Shingrix - counseling given; COVID19 vacc done;  mammo pending this Thursday 4/29/21 (last 1/28/20); GYN w/ Dr. Hayward 2/9/21 (next appt scheduled 2/28/22); DEXA ordered; colonosc due with Dr. Verdin (has had 2 - last about 5 yrs ago); eye exam UTD - wears contacts;  2. Request records and labs from Dr. Bianchi  3. RTC approx 12/21 for updated PE; fasting labs the week prior to appt (CBC, CMP, TSH, lipids, UA/micro, FT4, HCV)      Electronically signed by:    Opal Fournier MD, FACP  04/26/2021

## 2021-04-26 ENCOUNTER — OFFICE VISIT (OUTPATIENT)
Dept: INTERNAL MEDICINE | Facility: CLINIC | Age: 61
End: 2021-04-26

## 2021-04-26 VITALS
HEIGHT: 65 IN | WEIGHT: 138 LBS | DIASTOLIC BLOOD PRESSURE: 68 MMHG | SYSTOLIC BLOOD PRESSURE: 124 MMHG | HEART RATE: 68 BPM | BODY MASS INDEX: 22.99 KG/M2 | OXYGEN SATURATION: 100 %

## 2021-04-26 DIAGNOSIS — Z78.0 MENOPAUSE: Primary | ICD-10-CM

## 2021-04-26 DIAGNOSIS — K59.09 CHRONIC CONSTIPATION: Chronic | ICD-10-CM

## 2021-04-26 DIAGNOSIS — E05.90 HYPERTHYROIDISM: Chronic | ICD-10-CM

## 2021-04-26 DIAGNOSIS — L60.9 FINGERNAIL PROBLEM: ICD-10-CM

## 2021-04-26 PROCEDURE — 99205 OFFICE O/P NEW HI 60 MIN: CPT | Performed by: INTERNAL MEDICINE

## 2021-04-26 RX ORDER — NEOMYCIN SULFATE, POLYMYXIN B SULFATE AND DEXAMETHASONE 3.5; 10000; 1 MG/ML; [USP'U]/ML; MG/ML
SUSPENSION/ DROPS OPHTHALMIC
COMMUNITY
Start: 2021-04-14 | End: 2022-01-25

## 2021-04-29 ENCOUNTER — HOSPITAL ENCOUNTER (OUTPATIENT)
Dept: MAMMOGRAPHY | Facility: HOSPITAL | Age: 61
Discharge: HOME | End: 2021-04-29
Admitting: OBSTETRICS & GYNECOLOGY

## 2021-04-29 ENCOUNTER — APPOINTMENT (OUTPATIENT)
Dept: OTHER | Facility: HOSPITAL | Age: 61
End: 2021-04-29

## 2021-04-29 DIAGNOSIS — Z12.31 VISIT FOR SCREENING MAMMOGRAM: ICD-10-CM

## 2021-04-29 PROCEDURE — 77067 SCR MAMMO BI INCL CAD: CPT | Performed by: RADIOLOGY

## 2021-04-29 PROCEDURE — 77067 SCR MAMMO BI INCL CAD: CPT

## 2021-04-29 PROCEDURE — 77063 BREAST TOMOSYNTHESIS BI: CPT | Performed by: RADIOLOGY

## 2021-04-29 PROCEDURE — 77063 BREAST TOMOSYNTHESIS BI: CPT

## 2021-05-10 PROBLEM — R73.01 IMPAIRED FASTING GLUCOSE: Status: ACTIVE | Noted: 2021-05-10

## 2021-08-10 ENCOUNTER — HOSPITAL ENCOUNTER (OUTPATIENT)
Dept: BONE DENSITY | Facility: HOSPITAL | Age: 61
Discharge: HOME | End: 2021-08-10
Admitting: INTERNAL MEDICINE

## 2021-08-10 DIAGNOSIS — Z78.0 MENOPAUSE: ICD-10-CM

## 2021-08-10 PROBLEM — M85.89 OSTEOPENIA OF MULTIPLE SITES: Status: ACTIVE | Noted: 2021-08-10

## 2021-08-10 PROCEDURE — 77080 DXA BONE DENSITY AXIAL: CPT

## 2021-08-11 NOTE — PROGRESS NOTES
Dear Falguni,    Thank you for obtaining your DEXA (bone density) scan.  I have received those results and would like to review them with you.      Your bone density is in the osteopenic range.  This is between normal and osteoporosis.      The values indicate that your risk of a major fracture in the next 10 years is 15%.    Please maintain regular calcium and vitamin D supplementation.  Calcium intake should be 1000mg per day between diet and supplements.  Weight bearing exercise is good for bone health as well.    We should plan to repeat your DEXA in 2 years.  Please let me know if you have any questions or concerns regarding these results.        Sincerely,  Opal Fournier MD, FACP

## 2021-08-23 ENCOUNTER — CLINICAL SUPPORT (OUTPATIENT)
Dept: INTERNAL MEDICINE | Facility: CLINIC | Age: 61
End: 2021-08-23

## 2021-08-23 DIAGNOSIS — Z23 NEED FOR VACCINATION: ICD-10-CM

## 2021-08-23 PROCEDURE — 90632 HEPA VACCINE ADULT IM: CPT | Performed by: INTERNAL MEDICINE

## 2021-08-23 PROCEDURE — 90471 IMMUNIZATION ADMIN: CPT | Performed by: INTERNAL MEDICINE

## 2021-09-08 ENCOUNTER — CLINICAL SUPPORT (OUTPATIENT)
Dept: INTERNAL MEDICINE | Facility: CLINIC | Age: 61
End: 2021-09-08

## 2021-09-08 DIAGNOSIS — Z23 NEED FOR SHINGLES VACCINE: Primary | ICD-10-CM

## 2021-09-08 PROCEDURE — 90471 IMMUNIZATION ADMIN: CPT | Performed by: INTERNAL MEDICINE

## 2021-09-08 PROCEDURE — 90750 HZV VACC RECOMBINANT IM: CPT | Performed by: INTERNAL MEDICINE

## 2021-09-08 NOTE — PROGRESS NOTES
Immunization  Immunization performed in L deltoid by Julia Dodson MA. Patient tolerated the procedure well without complications.  09/08/21   Julia Dodson MA

## 2021-10-25 ENCOUNTER — CLINICAL SUPPORT (OUTPATIENT)
Dept: INTERNAL MEDICINE | Facility: CLINIC | Age: 61
End: 2021-10-25

## 2021-10-25 DIAGNOSIS — Z23 NEED FOR INFLUENZA VACCINATION: Primary | ICD-10-CM

## 2021-10-25 PROCEDURE — 90471 IMMUNIZATION ADMIN: CPT | Performed by: INTERNAL MEDICINE

## 2021-10-25 PROCEDURE — 90686 IIV4 VACC NO PRSV 0.5 ML IM: CPT | Performed by: INTERNAL MEDICINE

## 2021-12-28 ENCOUNTER — TELEPHONE (OUTPATIENT)
Dept: INTERNAL MEDICINE | Facility: CLINIC | Age: 61
End: 2021-12-28

## 2021-12-28 DIAGNOSIS — Z00.00 ANNUAL PHYSICAL EXAM: ICD-10-CM

## 2021-12-28 DIAGNOSIS — R73.01 IMPAIRED FASTING GLUCOSE: ICD-10-CM

## 2021-12-28 DIAGNOSIS — E05.90 HYPERTHYROIDISM: Primary | ICD-10-CM

## 2022-01-10 ENCOUNTER — TELEPHONE (OUTPATIENT)
Dept: INTERNAL MEDICINE | Facility: CLINIC | Age: 62
End: 2022-01-10

## 2022-01-10 ENCOUNTER — CLINICAL SUPPORT (OUTPATIENT)
Dept: INTERNAL MEDICINE | Facility: CLINIC | Age: 62
End: 2022-01-10

## 2022-01-10 DIAGNOSIS — R05.9 COUGH: Primary | ICD-10-CM

## 2022-01-10 DIAGNOSIS — Z23 NEED FOR HEPATITIS A VACCINATION: ICD-10-CM

## 2022-01-10 PROCEDURE — 90471 IMMUNIZATION ADMIN: CPT | Performed by: INTERNAL MEDICINE

## 2022-01-10 PROCEDURE — 90632 HEPA VACCINE ADULT IM: CPT | Performed by: INTERNAL MEDICINE

## 2022-01-10 NOTE — TELEPHONE ENCOUNTER
Pt notified that our lab is a [facility] and she should be able to have those done here in our facility. She is going to double check with insurance and then will stop by later this week to have those done.

## 2022-01-10 NOTE — TELEPHONE ENCOUNTER
Caller: Falguni Alonzo    Relationship: Self    Best call back number: [phone]    What orders are you requesting (i.e. lab or imaging): LAB WORK    In what timeframe would the patient need to come in: AS SOON AS POSSIBLE    Where will you receive your lab/imaging services: [facility] AT   [address] [address]    Additional notes:PATIENT STATED SHE SAW IN MY CHART SHE HAD LAB WORK ORDERED BUT HER INSURANCE WILL ONLY PAY FOR LAB WORK THROUGH MY CHART

## 2022-01-10 NOTE — PROGRESS NOTES
Immunization  Immunization performed in Right deltoid by Gretchen Perdomo MA. Pt tolerated Hep A vaccine with no adverse effects  01/10/22   Gretchen Perdomo MA

## 2022-01-14 ENCOUNTER — LAB (OUTPATIENT)
Dept: LAB | Facility: HOSPITAL | Age: 62
End: 2022-01-14

## 2022-01-14 DIAGNOSIS — Z00.00 ANNUAL PHYSICAL EXAM: ICD-10-CM

## 2022-01-14 DIAGNOSIS — E05.90 HYPERTHYROIDISM: ICD-10-CM

## 2022-01-15 LAB
ALBUMIN SERPL-MCNC: 4.7 G/DL (ref 3.5–5.2)
ALBUMIN/GLOB SERPL: 2.5 G/DL
ALP SERPL-CCNC: 78 U/L (ref 39–117)
ALT SERPL-CCNC: 20 U/L (ref 1–33)
APPEARANCE UR: ABNORMAL
AST SERPL-CCNC: 25 U/L (ref 1–32)
BACTERIA #/AREA URNS HPF: NORMAL /HPF
BASOPHILS # BLD AUTO: 0.02 10*3/MM3 (ref 0–0.2)
BASOPHILS NFR BLD AUTO: 0.4 % (ref 0–1.5)
BILIRUB SERPL-MCNC: 0.2 MG/DL (ref 0–1.2)
BILIRUB UR QL STRIP: NEGATIVE
BUN SERPL-MCNC: 18 MG/DL (ref 8–23)
BUN/CREAT SERPL: 27.7 (ref 7–25)
CALCIUM SERPL-MCNC: 10.2 MG/DL (ref 8.6–10.5)
CHLORIDE SERPL-SCNC: 102 MMOL/L (ref 98–107)
CHOLEST SERPL-MCNC: 184 MG/DL (ref 0–200)
CO2 SERPL-SCNC: 24.8 MMOL/L (ref 22–29)
COLOR UR: YELLOW
CREAT SERPL-MCNC: 0.65 MG/DL (ref 0.57–1)
EOSINOPHIL # BLD AUTO: 0.12 10*3/MM3 (ref 0–0.4)
EOSINOPHIL NFR BLD AUTO: 2.7 % (ref 0.3–6.2)
EPI CELLS #/AREA URNS HPF: NORMAL /HPF
ERYTHROCYTE [DISTWIDTH] IN BLOOD BY AUTOMATED COUNT: 12.3 % (ref 12.3–15.4)
GLOBULIN SER CALC-MCNC: 1.9 GM/DL
GLUCOSE SERPL-MCNC: 96 MG/DL (ref 65–99)
GLUCOSE UR QL: NEGATIVE
HCT VFR BLD AUTO: 35.6 % (ref 34–46.6)
HCV AB S/CO SERPL IA: 0.1 S/CO RATIO (ref 0–0.9)
HDLC SERPL-MCNC: 74 MG/DL (ref 40–60)
HGB BLD-MCNC: 12.2 G/DL (ref 12–15.9)
HGB UR QL STRIP: NEGATIVE
IMM GRANULOCYTES # BLD AUTO: 0.01 10*3/MM3 (ref 0–0.05)
IMM GRANULOCYTES NFR BLD AUTO: 0.2 % (ref 0–0.5)
KETONES UR QL STRIP: NEGATIVE
LDLC SERPL CALC-MCNC: 101 MG/DL (ref 0–100)
LEUKOCYTE ESTERASE UR QL STRIP: NEGATIVE
LYMPHOCYTES # BLD AUTO: 1.79 10*3/MM3 (ref 0.7–3.1)
LYMPHOCYTES NFR BLD AUTO: 40 % (ref 19.6–45.3)
MCH RBC QN AUTO: 31.4 PG (ref 26.6–33)
MCHC RBC AUTO-ENTMCNC: 34.3 G/DL (ref 31.5–35.7)
MCV RBC AUTO: 91.5 FL (ref 79–97)
MONOCYTES # BLD AUTO: 0.45 10*3/MM3 (ref 0.1–0.9)
MONOCYTES NFR BLD AUTO: 10.1 % (ref 5–12)
NEUTROPHILS # BLD AUTO: 2.08 10*3/MM3 (ref 1.7–7)
NEUTROPHILS NFR BLD AUTO: 46.6 % (ref 42.7–76)
NITRITE UR QL STRIP: NEGATIVE
NRBC BLD AUTO-RTO: 0 /100 WBC (ref 0–0.2)
PH UR STRIP: 6 [PH] (ref 5–8)
PLATELET # BLD AUTO: 314 10*3/MM3 (ref 140–450)
POTASSIUM SERPL-SCNC: 4.7 MMOL/L (ref 3.5–5.2)
PROT SERPL-MCNC: 6.6 G/DL (ref 6–8.5)
PROT UR QL STRIP: NEGATIVE
RBC # BLD AUTO: 3.89 10*6/MM3 (ref 3.77–5.28)
RBC #/AREA URNS HPF: NORMAL /HPF
SODIUM SERPL-SCNC: 139 MMOL/L (ref 136–145)
SP GR UR: 1.01 (ref 1–1.03)
T4 FREE SERPL-MCNC: 1.07 NG/DL (ref 0.93–1.7)
TRIGL SERPL-MCNC: 47 MG/DL (ref 0–150)
TSH SERPL DL<=0.005 MIU/L-ACNC: 2.13 UIU/ML (ref 0.27–4.2)
UROBILINOGEN UR STRIP-MCNC: ABNORMAL MG/DL
VLDLC SERPL CALC-MCNC: 9 MG/DL (ref 5–40)
WBC # BLD AUTO: 4.47 10*3/MM3 (ref 3.4–10.8)
WBC #/AREA URNS HPF: NORMAL /HPF

## 2022-01-24 ENCOUNTER — TELEPHONE (OUTPATIENT)
Dept: OBSTETRICS AND GYNECOLOGY | Facility: CLINIC | Age: 62
End: 2022-01-24

## 2022-01-24 PROBLEM — R73.01 IMPAIRED FASTING GLUCOSE: Chronic | Status: ACTIVE | Noted: 2021-05-10

## 2022-01-24 PROBLEM — Z00.00 PE (PHYSICAL EXAM), ROUTINE: Status: ACTIVE | Noted: 2022-01-24

## 2022-01-24 PROBLEM — M85.89 OSTEOPENIA OF MULTIPLE SITES: Chronic | Status: ACTIVE | Noted: 2021-08-10

## 2022-01-24 PROBLEM — Z00.00 PE (PHYSICAL EXAM), ROUTINE: Chronic | Status: ACTIVE | Noted: 2022-01-24

## 2022-01-24 NOTE — ASSESSMENT & PLAN NOTE
BG control stable with A1C 5.5; encouraged reg phys activity to decr insulin resistance, moderation in unhealthy starches/sweets; f/u A1C in 12 mos

## 2022-01-24 NOTE — TELEPHONE ENCOUNTER
Patient is seeing PCP, Dr. Opal Fournier, tomorrow.  She plans to ask Dr. Fournier if she is willing to do yearly breast and pelvic exam, and if so, will not schedule with our practice. She is aware that we would be willing to see her at any time.

## 2022-01-24 NOTE — PROGRESS NOTES
Chief Complaint   Patient presents with   • Annual Exam   • Impaired fasting glucose       History of Present Illness  61 y.o.  woman presents for updated phys examination and Pap smear. Has had hysterectomy due to prolapse; ovaries still intact. Dr. Hayward had indicated rec to repeat Paps every 5 yrs. Patient without discharge or bleeding.    Exercises regularly.    Has hammertoes on bilat 2nd toes.  Reports no assoc'd pain, just has to wear appropriate shoes. Dr. Hull also recommended not to pursue surgery unless symptomatic.    Reports fingernail fungus left thumb and index fingers. Does not cause pain, bleeding, brittleness. Tries to keep nails trimmed. Inquiring about whether to try lamisil. Has worked in the past.     Reports occ right knee and shoulder pains, improved with regular PT exercises. Requesting #30 ibuprofen 800mg,which should last her for 1 yr.    Also requesting selenium sulfide lotion, which she uses as shampoo for rash in her scalp. Uses about 5 oz per week.    Review of Systems  Denies headaches, visual changes, CP, palpitations, SOB, cough, abd pain, n/v/d, difficulty with urination, numbness/tingling, falls, mood changes, lightheadedness, hearing changes. Has chronic constipation, controlled with miralax, citrucel, and DOS.    ROS (+) for arthritis knee and shoulder pains as noted, controlled with exercises.    ROS (+) for white discoloration of 2 fingernails as noted above.    Has right tinnitus; had hearing exam with Dr. Marx, noted to have only mild hearing loss.    Denies vaginal discharge or bleeding (no periods) or breast concerns.   All other ROS reviewed and negative.    Current Outpatient Medications:   •  Calcium Carbonate-Vitamin D (CALCIUM 500+D HIGH POTENCY) 500-400 MG-UNIT QD  •  Cholecalciferol (VITAMIN D) 1000 UNITS QD  •  docusate sodium (COLACE) 250 MG BID  •  methylcellulose (CITRUCEL) oral powder, QD:   •  Minoxidil (Rogaine Womens) 5 % foam, Rogaine 5 %  "topical foam  AD  •  Multiple Vitamins-Minerals (CENTRUM PO), QD  •  neomycin-polymyxin-dexamethasone (MAXITROL) 3.5-82911-3.1 ophthalmic suspension AD  •  polyethylene glycol (MIRALAX) packet, Take 17 g QD      VITALS:  /64   Pulse 90   Ht 165.1 cm (65\")   Wt 62.6 kg (138 lb)   LMP  (LMP Unknown)   SpO2 98%   BMI 22.96 kg/m²     Physical Exam  Vitals and nursing note reviewed.   Constitutional:       General: She is not in acute distress.     Appearance: Normal appearance. She is well-developed.   HENT:      Head: Normocephalic.      Right Ear: Tympanic membrane, ear canal and external ear normal.      Left Ear: Tympanic membrane, ear canal and external ear normal.      Nose: Nose normal.   Eyes:      Extraocular Movements: Extraocular movements intact.      Conjunctiva/sclera: Conjunctivae normal.      Pupils: Pupils are equal, round, and reactive to light.   Neck:      Vascular: No carotid bruit (bilaterally).   Cardiovascular:      Rate and Rhythm: Normal rate and regular rhythm.      Pulses: Normal pulses.      Heart sounds: Normal heart sounds.   Pulmonary:      Effort: Pulmonary effort is normal. No respiratory distress.      Breath sounds: Normal breath sounds. No wheezing or rales.   Abdominal:      General: Bowel sounds are normal. There is no distension.      Palpations: Abdomen is soft. There is no mass.      Tenderness: There is no abdominal tenderness.   Genitourinary:     Comments: Chaperone declined by patient.    Breast exam unremarkable without masses, skin changes, nipple discharge, or axillary adenopathy.    Pelvic exam performed with normal external genitalia; normal urethral meatus and urethral exam; mildly atrophic vagina mucosa, no discharge in the vaginal vault; no masses palpated/seen and no CMT or adnexal tenderness with palpation; no bladder abnormality noted; perineum intact.    Musculoskeletal:         General: Deformity (hammertoes bilat 2nd toes) present.      Cervical " back: Normal range of motion and neck supple.      Right lower leg: No edema.      Left lower leg: No edema.   Lymphadenopathy:      Cervical: No cervical adenopathy.   Skin:     General: Skin is warm and dry.      Findings: No rash.      Comments: Dry  Skin with excoriations inner right thumb   Neurological:      Mental Status: She is alert and oriented to person, place, and time.      Cranial Nerves: No cranial nerve deficit.      Gait: Gait normal.      Deep Tendon Reflexes: Reflexes normal.   Psychiatric:         Mood and Affect: Mood normal.         Behavior: Behavior normal.           LABS  Results for orders placed or performed in visit on 01/25/22   POC Glycosylated Hemoglobin (Hb A1C)    Specimen: Blood   Result Value Ref Range    Hemoglobin A1C 5.5 %    Lot Number [id number]     Expiration Date 08/30/23 1/14/22 stable CBC, CMP (FG 96), TFTs, UA, neg HCV, stable lipids (, TG 47, HDL 74, ) - also note patient was not fasting    ECG 12 Lead    Date/Time: 1/25/2022 2:00 PM  Performed by: Opal Fournier MD  Authorized by: Opal Fournier MD   Comparison: compared with previous ECG from 4/23/2019  Similar to previous ECG  Rhythm: sinus rhythm  Rate: normal  BPM: 78  Conduction: conduction normal  ST Segments: ST segments normal  T Waves: T waves normal  QRS axis: left  Clinical impression comment: stable EKG              ASSESSMENT/PLAN    Diagnoses and all orders for this visit:    1. PE (physical exam), routine (Primary)  Assessment & Plan:  Health maintenance - COVID19 vacc done, rec including 3rd dose Pfizer; flu 10/21; Tdap 3/15 (Next Td due 2025), HAV done; Shingrix #2 due (#1 in 9/21) - she will do in about 2 wks; mammo 4/29/21; breast/pelvic with Pap performed today - with h/o TAYLOR not due to concern, rec stopping Paps unless abnlities occur; ovaries intact, therefore provided info re: [place] ovarian screening program; DEXA 8/21, repeat 2023; colonosc pending 1/27/22 with Dr. Verdin; eye exam  2021; dental exams UTD, had tooth extraction yesterday; (+) seat belt      2. Impaired fasting glucose  Assessment & Plan:  BG control stable with A1C 5.5; encouraged reg phys activity to decr insulin resistance, moderation in unhealthy starches/sweets; f/u A1C in 12 mos      Orders:  -     POC Glycosylated Hemoglobin (Hb A1C)    3. Hyperthyroidism due to Graves disease  Assessment & Plan:  Currently euthyroid on no meds; f/u endo as scheduled 2/23/22 with Dr. Whitney      4. Osteopenia  Assessment & Plan:  Calcium and vitamin D supplementation with weight-bearing exercise; DEXA 8/21, repeat 2023         5. Family history of heart disease  -     ECG 12 Lead    6. Osteoarthritis involving multiple joints  Assessment & Plan:  Judicious use of ibuprofen 800mg tid prn #30, 0RF (should last 1 yr per patient); caution given re: NSAIDs; she states meloxicam does not work as well    Orders:  -     ibuprofen (ADVIL,MOTRIN) 800 MG tablet; 1 PO TID prn with food  Dispense: 60 tablet; Refill: 0    7. Seborrheic dermatitis  Assessment & Plan:  Scalp; RX selenium 2.5% lotion to scalp as directed #1 bottle, 0RF    Orders:  -     selenium sulfide (SELSUN) 2.5 % lotion; Apply  topically to the appropriate area as directed Daily As Needed for Dandruff.  Dispense: 118 mL; Refill: 0    8. Hand eczema  Assessment & Plan:  rec more freq moisturizing and not picking at the area      9. Onychomycosis  Assessment & Plan:  Left thumb and index finger white discoloration 2mm of distal nail; discussed hepatotoxic risks of lamisil, which would be a 6 wk course for fingernails; reviewed topical options, including trial of teatree oil; otherwise keep affected nail trimmed as much as possible; currently asx except for cosmetic appearance        FOLLOW-UP  RTC for annual PE in 1 yr; fasting labs the week prior to appt (CBC, CMP, TSH, lipids, UA/micro, FT4, A1C)      Electronically signed by:    Opal Fournier MD, FACP  01/25/2022

## 2022-01-24 NOTE — ASSESSMENT & PLAN NOTE
Health maintenance - COVID19 vacc done, rec including 3rd dose Pfizer; flu 10/21; Tdap 3/15 (Next Td due 2025), HAV done; Shingrix #2 due (#1 in 9/21) - she will do in about 2 wks; mammo 4/29/21; breast/pelvic with Pap performed today - with h/o TAYLOR not due to concern, rec stopping Paps unless abnlities occur; ovaries intact, therefore provided info re: [place] ovarian screening program; DEXA 8/21, repeat 2023; colonosc pending 1/27/22 with Dr. Verdin; eye exam 2021; dental exams UTD, had tooth extraction yesterday; (+) seat belt

## 2022-01-25 ENCOUNTER — OFFICE VISIT (OUTPATIENT)
Dept: INTERNAL MEDICINE | Facility: CLINIC | Age: 62
End: 2022-01-25

## 2022-01-25 VITALS
DIASTOLIC BLOOD PRESSURE: 64 MMHG | HEIGHT: 65 IN | BODY MASS INDEX: 22.99 KG/M2 | OXYGEN SATURATION: 98 % | HEART RATE: 90 BPM | WEIGHT: 138 LBS | SYSTOLIC BLOOD PRESSURE: 112 MMHG

## 2022-01-25 DIAGNOSIS — M85.89 OSTEOPENIA OF MULTIPLE SITES: Chronic | ICD-10-CM

## 2022-01-25 DIAGNOSIS — R73.01 IMPAIRED FASTING GLUCOSE: Chronic | ICD-10-CM

## 2022-01-25 DIAGNOSIS — E05.90 HYPERTHYROIDISM: Chronic | ICD-10-CM

## 2022-01-25 DIAGNOSIS — Z82.49 FAMILY HISTORY OF HEART DISEASE: ICD-10-CM

## 2022-01-25 DIAGNOSIS — B35.1 ONYCHOMYCOSIS: ICD-10-CM

## 2022-01-25 DIAGNOSIS — L30.9 HAND ECZEMA: ICD-10-CM

## 2022-01-25 DIAGNOSIS — L21.9 SEBORRHEIC DERMATITIS: ICD-10-CM

## 2022-01-25 DIAGNOSIS — Z00.00 PE (PHYSICAL EXAM), ROUTINE: Primary | ICD-10-CM

## 2022-01-25 DIAGNOSIS — M15.9 PRIMARY OSTEOARTHRITIS INVOLVING MULTIPLE JOINTS: ICD-10-CM

## 2022-01-25 PROBLEM — M20.42 HAMMERTOE, BILATERAL: Status: ACTIVE | Noted: 2022-01-25

## 2022-01-25 PROBLEM — H93.11 TINNITUS OF RIGHT EAR: Status: ACTIVE | Noted: 2022-01-25

## 2022-01-25 PROBLEM — M15.0 PRIMARY OSTEOARTHRITIS INVOLVING MULTIPLE JOINTS: Chronic | Status: ACTIVE | Noted: 2022-01-25

## 2022-01-25 PROBLEM — M20.41 HAMMERTOE, BILATERAL: Status: ACTIVE | Noted: 2022-01-25

## 2022-01-25 PROBLEM — M15.0 PRIMARY OSTEOARTHRITIS INVOLVING MULTIPLE JOINTS: Status: ACTIVE | Noted: 2022-01-25

## 2022-01-25 LAB
EXPIRATION DATE: NORMAL
HBA1C MFR BLD: 5.5 %
Lab: NORMAL

## 2022-01-25 PROCEDURE — 99396 PREV VISIT EST AGE 40-64: CPT | Performed by: INTERNAL MEDICINE

## 2022-01-25 PROCEDURE — 93000 ELECTROCARDIOGRAM COMPLETE: CPT | Performed by: INTERNAL MEDICINE

## 2022-01-25 PROCEDURE — 83036 HEMOGLOBIN GLYCOSYLATED A1C: CPT | Performed by: INTERNAL MEDICINE

## 2022-01-25 RX ORDER — IBUPROFEN 800 MG/1
TABLET ORAL
Qty: 60 TABLET | Refills: 0 | Status: SHIPPED | OUTPATIENT
Start: 2022-01-25 | End: 2023-01-31 | Stop reason: SDUPTHER

## 2022-01-25 RX ORDER — SELENIUM SULFIDE 2.5 MG/100ML
LOTION TOPICAL DAILY PRN
Qty: 118 ML | Refills: 0 | Status: SHIPPED | OUTPATIENT
Start: 2022-01-25 | End: 2023-01-17

## 2022-01-25 NOTE — ASSESSMENT & PLAN NOTE
Judicious use of ibuprofen 800mg tid prn #30, 0RF (should last 1 yr per patient); caution given re: NSAIDs; she states meloxicam does not work as well

## 2022-01-25 NOTE — ASSESSMENT & PLAN NOTE
Left thumb and index finger white discoloration 2mm of distal nail; discussed hepatotoxic risks of lamisil, which would be a 6 wk course for fingernails; reviewed topical options, including trial of teatree oil; otherwise keep affected nail trimmed as much as possible; currently asx except for cosmetic appearance

## 2022-01-27 ENCOUNTER — TRANSCRIBE ORDERS (OUTPATIENT)
Dept: ADMINISTRATIVE | Facility: HOSPITAL | Age: 62
End: 2022-01-27

## 2022-01-27 DIAGNOSIS — Z12.31 VISIT FOR SCREENING MAMMOGRAM: Primary | ICD-10-CM

## 2022-02-23 ENCOUNTER — OFFICE VISIT (OUTPATIENT)
Dept: ENDOCRINOLOGY | Facility: CLINIC | Age: 62
End: 2022-02-23

## 2022-02-23 VITALS
HEART RATE: 72 BPM | BODY MASS INDEX: 22.93 KG/M2 | SYSTOLIC BLOOD PRESSURE: 105 MMHG | DIASTOLIC BLOOD PRESSURE: 60 MMHG | WEIGHT: 137.8 LBS

## 2022-02-23 DIAGNOSIS — R73.01 IMPAIRED FASTING GLUCOSE: Chronic | ICD-10-CM

## 2022-02-23 DIAGNOSIS — E05.90 HYPERTHYROIDISM: Primary | Chronic | ICD-10-CM

## 2022-02-23 PROCEDURE — 99213 OFFICE O/P EST LOW 20 MIN: CPT | Performed by: INTERNAL MEDICINE

## 2022-02-23 NOTE — ASSESSMENT & PLAN NOTE
Average sugar is good  Results for orders placed or performed in visit on 01/25/22   POC Glycosylated Hemoglobin (Hb A1C)    Specimen: Blood   Result Value Ref Range    Hemoglobin A1C 5.5 %    Lot Number [id number]     Expiration Date 08/30/23

## 2022-02-28 ENCOUNTER — CLINICAL SUPPORT (OUTPATIENT)
Dept: INTERNAL MEDICINE | Facility: CLINIC | Age: 62
End: 2022-02-28

## 2022-02-28 DIAGNOSIS — Z23 NEED FOR SHINGLES VACCINE: ICD-10-CM

## 2022-02-28 PROCEDURE — 90750 HZV VACC RECOMBINANT IM: CPT | Performed by: INTERNAL MEDICINE

## 2022-02-28 PROCEDURE — 90471 IMMUNIZATION ADMIN: CPT | Performed by: INTERNAL MEDICINE

## 2022-05-02 ENCOUNTER — HOSPITAL ENCOUNTER (OUTPATIENT)
Dept: MAMMOGRAPHY | Facility: HOSPITAL | Age: 62
Discharge: HOME | End: 2022-05-02
Admitting: INTERNAL MEDICINE

## 2022-05-02 DIAGNOSIS — Z12.31 VISIT FOR SCREENING MAMMOGRAM: ICD-10-CM

## 2022-05-02 PROCEDURE — 77067 SCR MAMMO BI INCL CAD: CPT

## 2022-05-02 PROCEDURE — 77067 SCR MAMMO BI INCL CAD: CPT | Performed by: RADIOLOGY

## 2022-05-02 PROCEDURE — 77063 BREAST TOMOSYNTHESIS BI: CPT | Performed by: RADIOLOGY

## 2022-05-02 PROCEDURE — 77063 BREAST TOMOSYNTHESIS BI: CPT

## 2022-08-24 ENCOUNTER — LAB (OUTPATIENT)
Dept: LAB | Facility: HOSPITAL | Age: 62
End: 2022-08-24

## 2022-08-24 PROCEDURE — 84439 ASSAY OF FREE THYROXINE: CPT | Performed by: INTERNAL MEDICINE

## 2022-08-24 PROCEDURE — 84445 ASSAY OF TSI GLOBULIN: CPT | Performed by: INTERNAL MEDICINE

## 2022-08-24 PROCEDURE — 84443 ASSAY THYROID STIM HORMONE: CPT | Performed by: INTERNAL MEDICINE

## 2022-08-25 LAB
T4 FREE SERPL-MCNC: 1.08 NG/DL (ref 0.93–1.7)
TSH SERPL DL<=0.05 MIU/L-ACNC: 2.57 UIU/ML (ref 0.27–4.2)

## 2022-08-26 LAB — TSI SER-ACNC: 0.21 IU/L (ref 0–0.55)

## 2022-10-13 ENCOUNTER — CLINICAL SUPPORT (OUTPATIENT)
Dept: INTERNAL MEDICINE | Facility: CLINIC | Age: 62
End: 2022-10-13

## 2022-10-13 DIAGNOSIS — Z23 NEED FOR VACCINATION: Primary | ICD-10-CM

## 2022-10-13 PROCEDURE — 91312 COVID-19 (PFIZER) BIVALENT BOOSTER 12+YRS: CPT | Performed by: INTERNAL MEDICINE

## 2022-10-13 PROCEDURE — 0124A PR ADM SARSCOV2 30MCG/0.3ML BST: CPT | Performed by: INTERNAL MEDICINE

## 2022-10-13 NOTE — PROGRESS NOTES
Immunization  Immunization performed in left deltoid by Felicity Hernandez MA. Patient tolerated the procedure well without complications.  10/13/22   Felicity Hernandez MA

## 2022-10-26 ENCOUNTER — FLU SHOT (OUTPATIENT)
Dept: INTERNAL MEDICINE | Facility: CLINIC | Age: 62
End: 2022-10-26

## 2022-10-26 DIAGNOSIS — Z23 NEED FOR INFLUENZA VACCINATION: Primary | ICD-10-CM

## 2022-10-26 PROCEDURE — 90686 IIV4 VACC NO PRSV 0.5 ML IM: CPT | Performed by: INTERNAL MEDICINE

## 2022-10-26 PROCEDURE — 90471 IMMUNIZATION ADMIN: CPT | Performed by: INTERNAL MEDICINE

## 2023-01-05 ENCOUNTER — PATIENT MESSAGE (OUTPATIENT)
Dept: INTERNAL MEDICINE | Facility: CLINIC | Age: 63
End: 2023-01-05
Payer: COMMERCIAL

## 2023-01-05 DIAGNOSIS — E05.90 HYPERTHYROIDISM: Chronic | ICD-10-CM

## 2023-01-05 DIAGNOSIS — Z00.00 PE (PHYSICAL EXAM), ROUTINE: Primary | Chronic | ICD-10-CM

## 2023-01-05 DIAGNOSIS — R73.01 IMPAIRED FASTING GLUCOSE: Chronic | ICD-10-CM

## 2023-01-05 NOTE — TELEPHONE ENCOUNTER
From: Falguni Alonzo  To: Opal Fournier  Sent: 1/5/2023 12:56 PM EST  Subject: Upcoming appointment labs     I have an appointment January 31. Will I need labs with this visit ? If so, I’d like to do those before my appointment if possible. Because I will be visiting my grandkids immediately prior to the appointment was curious if I could do labs next week sometime. If labs are not necessary, no worries just checking beforehand. I assume you would send orders to your lab ? I’m also a patient of Dr Alicea as well, is it possible to use their lab at that location? I struggled with lab at your building with my insurance. They didn’t bill it correctly and it took several calls to correct. If I need to use your buildings I’ll address billing initially.   Sorry for all the picky details , just trying to  Be proactive!   Thanks ,   Falguni Alonzo

## 2023-01-10 DIAGNOSIS — L21.9 SEBORRHEIC DERMATITIS: ICD-10-CM

## 2023-01-10 RX ORDER — SELENIUM SULFIDE 2.5 MG/100ML
LOTION TOPICAL DAILY PRN
Qty: 118 ML | Refills: 0 | OUTPATIENT
Start: 2023-01-10

## 2023-01-13 ENCOUNTER — LAB (OUTPATIENT)
Dept: LAB | Facility: HOSPITAL | Age: 63
End: 2023-01-13
Payer: COMMERCIAL

## 2023-01-13 DIAGNOSIS — Z00.00 PE (PHYSICAL EXAM), ROUTINE: Chronic | ICD-10-CM

## 2023-01-13 DIAGNOSIS — R73.01 IMPAIRED FASTING GLUCOSE: Chronic | ICD-10-CM

## 2023-01-13 DIAGNOSIS — E05.90 HYPERTHYROIDISM: Chronic | ICD-10-CM

## 2023-01-13 LAB
ALBUMIN SERPL-MCNC: 4.5 G/DL (ref 3.5–5.2)
ALBUMIN/GLOB SERPL: 2.8 G/DL
ALP SERPL-CCNC: 65 U/L (ref 39–117)
ALT SERPL-CCNC: 19 U/L (ref 1–33)
APPEARANCE UR: CLEAR
AST SERPL-CCNC: 24 U/L (ref 1–32)
BACTERIA #/AREA URNS HPF: NORMAL /HPF
BASOPHILS # BLD AUTO: 0.03 10*3/MM3 (ref 0–0.2)
BASOPHILS NFR BLD AUTO: 0.8 % (ref 0–1.5)
BILIRUB SERPL-MCNC: 0.3 MG/DL (ref 0–1.2)
BILIRUB UR QL STRIP: NEGATIVE
BUN SERPL-MCNC: 20 MG/DL (ref 8–23)
BUN/CREAT SERPL: 29.4 (ref 7–25)
CALCIUM SERPL-MCNC: 9.5 MG/DL (ref 8.6–10.5)
CASTS URNS MICRO: NORMAL
CHLORIDE SERPL-SCNC: 106 MMOL/L (ref 98–107)
CHOLEST SERPL-MCNC: 175 MG/DL (ref 0–200)
CO2 SERPL-SCNC: 28 MMOL/L (ref 22–29)
COLOR UR: YELLOW
CREAT SERPL-MCNC: 0.68 MG/DL (ref 0.57–1)
EGFRCR SERPLBLD CKD-EPI 2021: 98.6 ML/MIN/1.73
EOSINOPHIL # BLD AUTO: 0.13 10*3/MM3 (ref 0–0.4)
EOSINOPHIL NFR BLD AUTO: 3.4 % (ref 0.3–6.2)
EPI CELLS #/AREA URNS HPF: NORMAL /HPF
ERYTHROCYTE [DISTWIDTH] IN BLOOD BY AUTOMATED COUNT: 12.9 % (ref 12.3–15.4)
GLOBULIN SER CALC-MCNC: 1.6 GM/DL
GLUCOSE SERPL-MCNC: 91 MG/DL (ref 65–99)
GLUCOSE UR QL STRIP: NEGATIVE
HBA1C MFR BLD: 5.6 % (ref 4.8–5.6)
HCT VFR BLD AUTO: 34.8 % (ref 34–46.6)
HDLC SERPL-MCNC: 77 MG/DL (ref 40–60)
HGB BLD-MCNC: 11.4 G/DL (ref 12–15.9)
HGB UR QL STRIP: NEGATIVE
IMM GRANULOCYTES # BLD AUTO: 0 10*3/MM3 (ref 0–0.05)
IMM GRANULOCYTES NFR BLD AUTO: 0 % (ref 0–0.5)
KETONES UR QL STRIP: NEGATIVE
LDLC SERPL CALC-MCNC: 89 MG/DL (ref 0–100)
LEUKOCYTE ESTERASE UR QL STRIP: NEGATIVE
LYMPHOCYTES # BLD AUTO: 1.58 10*3/MM3 (ref 0.7–3.1)
LYMPHOCYTES NFR BLD AUTO: 41 % (ref 19.6–45.3)
MCH RBC QN AUTO: 30.3 PG (ref 26.6–33)
MCHC RBC AUTO-ENTMCNC: 32.8 G/DL (ref 31.5–35.7)
MCV RBC AUTO: 92.6 FL (ref 79–97)
MONOCYTES # BLD AUTO: 0.48 10*3/MM3 (ref 0.1–0.9)
MONOCYTES NFR BLD AUTO: 12.5 % (ref 5–12)
NEUTROPHILS # BLD AUTO: 1.63 10*3/MM3 (ref 1.7–7)
NEUTROPHILS NFR BLD AUTO: 42.3 % (ref 42.7–76)
NITRITE UR QL STRIP: NEGATIVE
NRBC BLD AUTO-RTO: 0 /100 WBC (ref 0–0.2)
PH UR STRIP: 6.5 [PH] (ref 5–8)
PLATELET # BLD AUTO: 281 10*3/MM3 (ref 140–450)
POTASSIUM SERPL-SCNC: 4.2 MMOL/L (ref 3.5–5.2)
PROT SERPL-MCNC: 6.1 G/DL (ref 6–8.5)
PROT UR QL STRIP: NEGATIVE
RBC # BLD AUTO: 3.76 10*6/MM3 (ref 3.77–5.28)
RBC #/AREA URNS HPF: NORMAL /HPF
SODIUM SERPL-SCNC: 142 MMOL/L (ref 136–145)
SP GR UR STRIP: 1.02 (ref 1–1.03)
T4 FREE SERPL-MCNC: 1.12 NG/DL (ref 0.93–1.7)
TRIGL SERPL-MCNC: 41 MG/DL (ref 0–150)
TSH SERPL DL<=0.005 MIU/L-ACNC: 2.21 UIU/ML (ref 0.27–4.2)
UROBILINOGEN UR STRIP-MCNC: NORMAL MG/DL
VLDLC SERPL CALC-MCNC: 9 MG/DL (ref 5–40)
WBC # BLD AUTO: 3.85 10*3/MM3 (ref 3.4–10.8)
WBC #/AREA URNS HPF: NORMAL /HPF

## 2023-01-16 DIAGNOSIS — L21.9 SEBORRHEIC DERMATITIS: ICD-10-CM

## 2023-01-17 RX ORDER — SELENIUM SULFIDE 2.5 MG/100ML
LOTION TOPICAL DAILY PRN
Qty: 118 ML | Refills: 0 | Status: SHIPPED | OUTPATIENT
Start: 2023-01-17 | End: 2023-01-31 | Stop reason: SDUPTHER

## 2023-01-29 PROBLEM — R07.89 CHEST TIGHTNESS: Status: RESOLVED | Noted: 2019-04-23 | Resolved: 2023-01-29

## 2023-01-30 NOTE — ASSESSMENT & PLAN NOTE
Health maintenance - COVID19 vacc done, incl new COVID19 vacc; flu 10/22; Tdap 3/15 (next Td due 2025), HAV and Shingrix done; mammo 5/2/22; no further Paps unless abnlities (TAYLOR); nl Pap 1/25/22; goes to [facility] ovarian screening; DEXA 8/21 with new osteopenia, next due after 8/10/23; colonosc 1/22, repeat 2029 per Dr. Verdin; eye exam 1/23, done a few weeks ago; dental exam q6 mos; (+) seat belt

## 2023-01-30 NOTE — ASSESSMENT & PLAN NOTE
Calcium and vitamin D supplementation with weight-bearing exercise; DEXA 8/21, due after 8/10/23

## 2023-01-30 NOTE — ASSESSMENT & PLAN NOTE
BG control acceptable with A1C 5.6; encouraged reg phys activity to decr insulin resistance, moderation in unhealthy starches/sweets; f/u A1C in 12 mos

## 2023-01-30 NOTE — PROGRESS NOTES
Chief Complaint   Patient presents with   • Annual Exam   • Glc intolerance       History of Present Illness  62 y.o.  woman presents for updated phys examination and f/u on sugars and thyroid.  Wonders whether she needs to still see Dr. Whitney because her thyroid is stable; at the same time, she wants to maintain that relationship.    Complains of urinary urgency, only with rare occasions of leakage, such as coughing and sneezing.  Does not have to wear a pad.  Does not want to take medication because of baseline constipation.  Admits to drinking a couple of cups of coffee a day.  Has been doing Kegel exercises.    Started taking collagen for hair growth and wonders whether this would be helpful.  Has found that Rogaine works for her.    Notes right second toe hammertoe, which seems to be stable with appropriate shoes.  Also has bunion on the left foot with the second toe overlapping onto the big toe.  Has seen Dr. Christianson in the past who recommended against surgery.  Patient does not have pain with walking.    Patient donates blood fairly regularly.  Blood type is B negative.  She donated blood about 1 week prior to labs.  Notes baseline low blood pressures but reports drinking enough water on a regular basis.  Sometimes gets lightheaded with standing.    Review of Systems  Denies headaches, visual changes, CP, palpitations, SOB, cough, abd pain, n/v/d,  numbness/tingling, falls, mood changes, light hearing changes, rashes.    ROS (+) urinary urgency with rare occasions of stress incontinence.  Denies dysuria.    S/p TAYLOR but ovaries intact; started going to [facility] ovarian screening last year.    ROS (+) for occasional positional lightheadedness as stated.    ROS (+) for chronic constipation.    Denies vaginal discharge or bleeding (no periods) or breast concerns.     All other ROS reviewed and negative.    Current Outpatient Medications:   •  Calcium Carbonate-Vitamin D (CALCIUM 500+D) QD  •   "Cholecalciferol (VITAMIN D) 1000 UNITS QD  •  docusate sodium (COLACE) 250 MG BID  •  ibuprofen (ADVIL,MOTRIN) 800 MG prn  •  methylcellulose (CITRUCEL) oral powder QD  •  Minoxidil (Rogaine Womens) 5 % foam QD  •  CENTRUM PO QD  •  polyethylene glycol (MIRALAX) packet 17 g bQD  •  selenium sulfide (SELSUN) 2.5 % lotion AD      VITALS:  /62   Pulse 67   Ht 162.6 cm (64\")   Wt 61.7 kg (136 lb)   LMP  (LMP Unknown)   SpO2 98%   BMI 23.34 kg/m²     Physical Exam  Vitals and nursing note reviewed.   Constitutional:       General: She is not in acute distress.     Appearance: Normal appearance. She is well-developed.   HENT:      Head: Normocephalic.      Right Ear: Tympanic membrane, ear canal and external ear normal.      Left Ear: Tympanic membrane, ear canal and external ear normal.      Nose: Nose normal.   Eyes:      Extraocular Movements: Extraocular movements intact.      Conjunctiva/sclera: Conjunctivae normal.      Pupils: Pupils are equal, round, and reactive to light.   Neck:      Vascular: No carotid bruit (bilaterally).   Cardiovascular:      Rate and Rhythm: Normal rate and regular rhythm.      Heart sounds: Normal heart sounds.   Pulmonary:      Effort: Pulmonary effort is normal. No respiratory distress.      Breath sounds: Normal breath sounds. No wheezing or rales.   Abdominal:      General: Bowel sounds are normal. There is no distension.      Palpations: Abdomen is soft. There is no mass.      Tenderness: There is no abdominal tenderness.   Genitourinary:     Comments: Chaperone declined by patient.    Breast exam unremarkable without masses, skin changes, nipple discharge, or axillary adenopathy.    Musculoskeletal:         General: Deformity (Right second toe hammertoe, nontender; left foot medial bunion first MTP, second toe mildly overlapping big toe) present.      Cervical back: Normal range of motion and neck supple.      Right lower leg: No edema.      Left lower leg: No edema. "   Lymphadenopathy:      Cervical: No cervical adenopathy.   Skin:     General: Skin is warm and dry.      Findings: No rash.   Neurological:      Mental Status: She is alert and oriented to person, place, and time.      Cranial Nerves: No cranial nerve deficit.      Deep Tendon Reflexes: Reflexes normal.   Psychiatric:         Mood and Affect: Mood normal.         Behavior: Behavior normal.         LABS  Results for orders placed or performed in visit on 01/13/23   T4, Free    Specimen: Blood    Blood  Release to mae   Result Value Ref Range    Free T4 1.12 0.93 - 1.70 ng/dL   Hemoglobin A1c    Specimen: Blood    Blood  Release to mae   Result Value Ref Range    Hemoglobin A1C 5.60 4.80 - 5.60 %   TSH    Specimen: Blood    Blood  Release to mae   Result Value Ref Range    TSH 2.210 0.270 - 4.200 uIU/mL   Lipid Panel    Specimen: Blood    Blood  Release to mae   Result Value Ref Range    Total Cholesterol 175 0 - 200 mg/dL    Triglycerides 41 0 - 150 mg/dL    HDL Cholesterol 77 (H) 40 - 60 mg/dL    VLDL Cholesterol Martin 9 5 - 40 mg/dL    LDL Chol Calc (NIH) 89 0 - 100 mg/dL   Comprehensive Metabolic Panel    Specimen: Blood    Blood  Release to mae   Result Value Ref Range    Glucose 91 65 - 99 mg/dL    BUN 20 8 - 23 mg/dL    Creatinine 0.68 0.57 - 1.00 mg/dL    EGFR Result 98.6 >60.0 mL/min/1.73    BUN/Creatinine Ratio 29.4 (H) 7.0 - 25.0    Sodium 142 136 - 145 mmol/L    Potassium 4.2 3.5 - 5.2 mmol/L    Chloride 106 98 - 107 mmol/L    Total CO2 28.0 22.0 - 29.0 mmol/L    Calcium 9.5 8.6 - 10.5 mg/dL    Total Protein 6.1 6.0 - 8.5 g/dL    Albumin 4.5 3.5 - 5.2 g/dL    Globulin 1.6 gm/dL    A/G Ratio 2.8 g/dL    Total Bilirubin 0.3 0.0 - 1.2 mg/dL    Alkaline Phosphatase 65 39 - 117 U/L    AST (SGOT) 24 1 - 32 U/L    ALT (SGPT) 19 1 - 33 U/L   Microscopic Examination -    Blood  Release to mae   Result Value Ref Range    WBC, UA 0-2 /HPF    RBC, UA 0-2 /HPF    Epithelial Cells (non renal) 0-2 /HPF    Cast Type  Comment     Bacteria, UA Comment None Seen /HPF   Urinalysis With Microscopic -    Specimen: Blood    Blood  Release to mae   Result Value Ref Range    Specific Gravity, UA 1.021 1.005 - 1.030    pH, UA 6.5 5.0 - 8.0    Color, UA Yellow     Appearance, UA Clear Clear    Leukocytes, UA Negative Negative    Protein Negative Negative    Glucose, UA Negative Negative    Ketones Negative Negative    Blood, UA Negative Negative    Bilirubin, UA Negative Negative    Urobilinogen, UA Comment     Nitrite, UA Negative Negative   CBC & Differential    Specimen: Blood    Blood  Release to mae   Result Value Ref Range    WBC 3.85 3.40 - 10.80 10*3/mm3    RBC 3.76 (L) 3.77 - 5.28 10*6/mm3    Hemoglobin 11.4 (L) 12.0 - 15.9 g/dL    Hematocrit 34.8 34.0 - 46.6 %    MCV 92.6 79.0 - 97.0 fL    MCH 30.3 26.6 - 33.0 pg    MCHC 32.8 31.5 - 35.7 g/dL    RDW 12.9 12.3 - 15.4 %    Platelets 281 140 - 450 10*3/mm3    Neutrophil Rel % 42.3 (L) 42.7 - 76.0 %    Lymphocyte Rel % 41.0 19.6 - 45.3 %    Monocyte Rel % 12.5 (H) 5.0 - 12.0 %    Eosinophil Rel % 3.4 0.3 - 6.2 %    Basophil Rel % 0.8 0.0 - 1.5 %    Neutrophils Absolute 1.63 (L) 1.70 - 7.00 10*3/mm3    Lymphocytes Absolute 1.58 0.70 - 3.10 10*3/mm3    Monocytes Absolute 0.48 0.10 - 0.90 10*3/mm3    Eosinophils Absolute 0.13 0.00 - 0.40 10*3/mm3    Basophils Absolute 0.03 0.00 - 0.20 10*3/mm3    Immature Granulocyte Rel % 0.0 0.0 - 0.5 %    Immature Grans Absolute 0.00 0.00 - 0.05 10*3/mm3    nRBC 0.0 0.0 - 0.2 /100 WBC     1/22 H&H 12.2/35.6    1/19 H&H 13.8/42.2    ASSESSMENT/PLAN    Diagnoses and all orders for this visit:    1. PE (physical exam), routine (Primary)  Assessment & Plan:  Health maintenance - COVID19 vacc done, incl new COVID19 vacc; flu 10/22; Tdap 3/15 (next Td due 2025), HAV and Shingrix done; mammo 5/2/22; no further Paps unless abnlities (TAYLOR); nl Pap 1/25/22; goes to [facility] ovarian screening; DEXA 8/21 with new osteopenia, next due after 8/10/23; colonosc 1/22,  repeat 2029 per Dr. Verdin; eye exam 1/23, done a few weeks ago; dental exam q6 mos; (+) seat belt      2. Impaired fasting glucose  Assessment & Plan:  BG control acceptable with A1C 5.6; encouraged reg phys activity to decr insulin resistance, moderation in unhealthy starches/sweets; f/u A1C in 12 mos        3. Hyperthyroidism due to Graves disease  Assessment & Plan:  Euthyroid; no meds; she is interested in seeing Dr. Whitney QOyr      4. Osteopenia  Assessment & Plan:  Calcium and vitamin D supplementation with weight-bearing exercise; DEXA 8/21, due after 8/10/23         5. Urinary urgency  Assessment & Plan:  Recommend consistent Kegel exercises and timed bathroom breaks; patient not interested in medication due to baseline constipation      6. Anemia, unspecified type  Assessment & Plan:  Borderline anemia compared to baseline blood counts; note that patient donated blood a week prior to labs; repeat CBC before donation for follow-up    Orders:  -     CBC & Differential; Future    7. Hammertoe, bilateral  Assessment & Plan:  Discussed wearing appropriate shoes to minimize friction; advised that surgery is a last resort when she is having intractable pain or cannot walk        FOLLOW-UP  1. F/u CBC for bord anemia attributed to donating blood  2. RTC 1yr for annual PE; fasting labs the week prior to appt (CBC, CMP, TSH, lipids, UA/micro, FT4, A1C, ferritin)      Electronically signed by:    Opal Fournier MD, FACP  01/31/2023

## 2023-01-31 ENCOUNTER — OFFICE VISIT (OUTPATIENT)
Dept: INTERNAL MEDICINE | Facility: CLINIC | Age: 63
End: 2023-01-31
Payer: COMMERCIAL

## 2023-01-31 VITALS
WEIGHT: 136 LBS | BODY MASS INDEX: 23.22 KG/M2 | HEIGHT: 64 IN | HEART RATE: 67 BPM | DIASTOLIC BLOOD PRESSURE: 62 MMHG | OXYGEN SATURATION: 98 % | SYSTOLIC BLOOD PRESSURE: 118 MMHG

## 2023-01-31 DIAGNOSIS — L21.9 SEBORRHEIC DERMATITIS: ICD-10-CM

## 2023-01-31 DIAGNOSIS — M20.41 HAMMERTOE, BILATERAL: ICD-10-CM

## 2023-01-31 DIAGNOSIS — E05.90 HYPERTHYROIDISM: Chronic | ICD-10-CM

## 2023-01-31 DIAGNOSIS — D64.9 ANEMIA, UNSPECIFIED TYPE: Chronic | ICD-10-CM

## 2023-01-31 DIAGNOSIS — R39.15 URINARY URGENCY: ICD-10-CM

## 2023-01-31 DIAGNOSIS — R73.01 IMPAIRED FASTING GLUCOSE: Chronic | ICD-10-CM

## 2023-01-31 DIAGNOSIS — Z00.00 PE (PHYSICAL EXAM), ROUTINE: Primary | Chronic | ICD-10-CM

## 2023-01-31 DIAGNOSIS — M20.42 HAMMERTOE, BILATERAL: ICD-10-CM

## 2023-01-31 DIAGNOSIS — M85.89 OSTEOPENIA OF MULTIPLE SITES: Chronic | ICD-10-CM

## 2023-01-31 DIAGNOSIS — M15.9 PRIMARY OSTEOARTHRITIS INVOLVING MULTIPLE JOINTS: ICD-10-CM

## 2023-01-31 PROCEDURE — 99396 PREV VISIT EST AGE 40-64: CPT | Performed by: INTERNAL MEDICINE

## 2023-01-31 RX ORDER — TERBINAFINE HYDROCHLORIDE 250 MG/1
TABLET ORAL
COMMUNITY
Start: 2023-01-02

## 2023-01-31 RX ORDER — SELENIUM SULFIDE 2.5 MG/100ML
LOTION TOPICAL DAILY PRN
Qty: 118 ML | Refills: 0 | Status: SHIPPED | OUTPATIENT
Start: 2023-01-31

## 2023-01-31 RX ORDER — IBUPROFEN 800 MG/1
TABLET ORAL
Qty: 60 TABLET | Refills: 0 | Status: SHIPPED | OUTPATIENT
Start: 2023-01-31

## 2023-01-31 NOTE — Clinical Note
Arik, Hi!! Patient wants to see if she can see you every other year. You were seeing her for Graves (h/o methimazole). Remains euthyroid without meds. Labs just updated for PE. Thanks, Opal

## 2023-01-31 NOTE — ASSESSMENT & PLAN NOTE
Borderline anemia compared to baseline blood counts; note that patient donated blood a week prior to labs; repeat CBC before donation for follow-up

## 2023-01-31 NOTE — ASSESSMENT & PLAN NOTE
Recommend consistent Kegel exercises and timed bathroom breaks; patient not interested in medication due to baseline constipation

## 2023-01-31 NOTE — ASSESSMENT & PLAN NOTE
Discussed wearing appropriate shoes to minimize friction; advised that surgery is a last resort when she is having intractable pain or cannot walk

## 2023-02-02 ENCOUNTER — TELEPHONE (OUTPATIENT)
Dept: ENDOCRINOLOGY | Facility: CLINIC | Age: 63
End: 2023-02-02
Payer: COMMERCIAL

## 2023-02-02 NOTE — TELEPHONE ENCOUNTER
Pt called to say that she just saw Dr Fournier and she did lab work and thyroid test was ok-does she need to keep her appt with Dr Whitney on  2/23/23?    She wanted to know how often she needs to see Dr Whitney she does not want to lose her as a Dr  pts number  [phone]

## 2023-03-08 ENCOUNTER — LAB (OUTPATIENT)
Dept: LAB | Facility: HOSPITAL | Age: 63
End: 2023-03-08
Payer: COMMERCIAL

## 2023-03-08 DIAGNOSIS — D64.9 ANEMIA, UNSPECIFIED TYPE: Chronic | ICD-10-CM

## 2023-03-09 LAB
BASOPHILS # BLD AUTO: 0.03 10*3/MM3 (ref 0–0.2)
BASOPHILS NFR BLD AUTO: 0.6 % (ref 0–1.5)
EOSINOPHIL # BLD AUTO: 0.08 10*3/MM3 (ref 0–0.4)
EOSINOPHIL NFR BLD AUTO: 1.7 % (ref 0.3–6.2)
ERYTHROCYTE [DISTWIDTH] IN BLOOD BY AUTOMATED COUNT: 12.8 % (ref 12.3–15.4)
HCT VFR BLD AUTO: 37.3 % (ref 34–46.6)
HGB BLD-MCNC: 12.5 G/DL (ref 12–15.9)
IMM GRANULOCYTES # BLD AUTO: 0.01 10*3/MM3 (ref 0–0.05)
IMM GRANULOCYTES NFR BLD AUTO: 0.2 % (ref 0–0.5)
LYMPHOCYTES # BLD AUTO: 1.55 10*3/MM3 (ref 0.7–3.1)
LYMPHOCYTES NFR BLD AUTO: 33.4 % (ref 19.6–45.3)
MCH RBC QN AUTO: 30.6 PG (ref 26.6–33)
MCHC RBC AUTO-ENTMCNC: 33.5 G/DL (ref 31.5–35.7)
MCV RBC AUTO: 91.2 FL (ref 79–97)
MONOCYTES # BLD AUTO: 0.56 10*3/MM3 (ref 0.1–0.9)
MONOCYTES NFR BLD AUTO: 12.1 % (ref 5–12)
NEUTROPHILS # BLD AUTO: 2.41 10*3/MM3 (ref 1.7–7)
NEUTROPHILS NFR BLD AUTO: 52 % (ref 42.7–76)
NRBC BLD AUTO-RTO: 0 /100 WBC (ref 0–0.2)
PLATELET # BLD AUTO: 312 10*3/MM3 (ref 140–450)
RBC # BLD AUTO: 4.09 10*6/MM3 (ref 3.77–5.28)
WBC # BLD AUTO: 4.64 10*3/MM3 (ref 3.4–10.8)

## 2023-03-20 ENCOUNTER — TRANSCRIBE ORDERS (OUTPATIENT)
Dept: ADMINISTRATIVE | Facility: HOSPITAL | Age: 63
End: 2023-03-20
Payer: COMMERCIAL

## 2023-03-20 DIAGNOSIS — Z12.31 VISIT FOR SCREENING MAMMOGRAM: Primary | ICD-10-CM

## 2023-05-08 ENCOUNTER — HOSPITAL ENCOUNTER (OUTPATIENT)
Dept: MAMMOGRAPHY | Facility: HOSPITAL | Age: 63
Discharge: HOME | End: 2023-05-08
Admitting: INTERNAL MEDICINE
Payer: COMMERCIAL

## 2023-05-08 DIAGNOSIS — Z12.31 VISIT FOR SCREENING MAMMOGRAM: ICD-10-CM

## 2023-05-08 PROCEDURE — 77063 BREAST TOMOSYNTHESIS BI: CPT | Performed by: RADIOLOGY

## 2023-05-08 PROCEDURE — 77067 SCR MAMMO BI INCL CAD: CPT

## 2023-05-08 PROCEDURE — 77067 SCR MAMMO BI INCL CAD: CPT | Performed by: RADIOLOGY

## 2023-05-08 PROCEDURE — 77063 BREAST TOMOSYNTHESIS BI: CPT

## 2023-05-09 ENCOUNTER — TELEPHONE (OUTPATIENT)
Dept: INTERNAL MEDICINE | Facility: CLINIC | Age: 63
End: 2023-05-09
Payer: COMMERCIAL

## 2023-05-09 NOTE — TELEPHONE ENCOUNTER
"----- Message from pOal Fournier MD sent at 5/9/2023  4:27 PM EDT -----  Just to confirm, recent mammogram showed no obvious abnormalities but noted to have \"architectural distortion\" in the right breast, therefore recommended to obtain additional views for further evaluation.    [facility] to have [facility] but please let us know if she does not hear from them  "

## 2023-09-01 ENCOUNTER — TELEPHONE (OUTPATIENT)
Dept: ENDOCRINOLOGY | Facility: CLINIC | Age: 63
End: 2023-09-01
Payer: COMMERCIAL

## 2023-09-01 DIAGNOSIS — E05.90 HYPERTHYROIDISM: Primary | Chronic | ICD-10-CM

## 2023-09-01 NOTE — TELEPHONE ENCOUNTER
----- Message from Falguni Alonzo sent at 8/31/2023  8:22 PM EDT -----  Regarding: Labs for upcoming appointment  Contact: [phone]  I’m assuming I’ll get labs with my next appointment. Two questions…1. Are the orders in so I can get them drawn the week before my appointment Monday sept 11? And 2. Do I need to be fasting , I assume not for thyroid labs but thought I should ask .   Thanks ,  Falguni Alonzo

## 2023-09-05 NOTE — TELEPHONE ENCOUNTER
Order for lab work thyroid function tests created   Ok to go to any [facility]  Thanks, marleen

## 2023-09-07 ENCOUNTER — LAB (OUTPATIENT)
Dept: ENDOCRINOLOGY | Facility: CLINIC | Age: 63
End: 2023-09-07
Payer: COMMERCIAL

## 2023-09-07 DIAGNOSIS — E05.90 HYPERTHYROIDISM: Chronic | ICD-10-CM

## 2023-09-07 PROCEDURE — 36415 COLL VENOUS BLD VENIPUNCTURE: CPT | Performed by: INTERNAL MEDICINE

## 2023-09-08 LAB
T4 FREE SERPL-MCNC: 1.21 NG/DL (ref 0.93–1.7)
TSH SERPL DL<=0.005 MIU/L-ACNC: 1.8 UIU/ML (ref 0.27–4.2)

## 2023-09-11 ENCOUNTER — OFFICE VISIT (OUTPATIENT)
Dept: ENDOCRINOLOGY | Facility: CLINIC | Age: 63
End: 2023-09-11
Payer: COMMERCIAL

## 2023-09-11 VITALS
BODY MASS INDEX: 23.63 KG/M2 | SYSTOLIC BLOOD PRESSURE: 112 MMHG | WEIGHT: 138.4 LBS | OXYGEN SATURATION: 97 % | HEIGHT: 64 IN | DIASTOLIC BLOOD PRESSURE: 60 MMHG | HEART RATE: 74 BPM

## 2023-09-11 DIAGNOSIS — R73.01 IMPAIRED FASTING GLUCOSE: Primary | Chronic | ICD-10-CM

## 2023-09-11 DIAGNOSIS — E05.90 HYPERTHYROIDISM: Chronic | ICD-10-CM

## 2023-09-11 PROCEDURE — 99213 OFFICE O/P EST LOW 20 MIN: CPT | Performed by: INTERNAL MEDICINE

## 2023-09-11 NOTE — PROGRESS NOTES
Falguni Alonzo 63 y.o.  CC:Follow-up and Hyperthyroidism    [place]: Follow-up and Hyperthyroidism    Doing well- no recurrence   Results for orders placed or performed in visit on 09/07/23   T4, Free    Specimen: Blood    Blood  Release to mae   Result Value Ref Range    Free T4 1.21 0.93 - 1.70 ng/dL   TSH    Specimen: Blood    Blood  Release to mae   Result Value Ref Range    TSH 1.800 0.270 - 4.200 uIU/mL     Reviewed tfts and currently no signs of recurrence of graves  She is aware of signs and symptoms     Allergies   Allergen Reactions    Erythromycin Hives and Rash       Current Outpatient Medications:     Calcium Carbonate-Vitamin D (OSCAL-500) 500-400 MG-UNIT per tablet, Take 1 tablet by mouth., Disp: , Rfl:     Cholecalciferol (VITAMIN D) 1000 UNITS tablet, Take 1 tablet by mouth daily., Disp: , Rfl:     docusate sodium (COLACE) 250 MG capsule, Take 250 mg by mouth 2 (two) times a day., Disp: , Rfl:     ibuprofen (ADVIL,MOTRIN) 800 MG tablet, 1 PO TID prn with food, Disp: 60 tablet, Rfl: 0    methylcellulose oral powder, Citrucel (sucrose) oral powder  Daily, Disp: , Rfl:     Minoxidil (Rogaine Womens) 5 % foam, Rogaine 5 % topical foam  Every day, Disp: , Rfl:     Multiple Vitamins-Minerals (CENTRUM PO), Take 1 tablet by mouth daily., Disp: , Rfl:     polyethylene glycol (MIRALAX) packet, Take 17 g by mouth Daily., Disp: , Rfl:     selenium sulfide (SELSUN) 2.5 % lotion, Apply  topically to the appropriate area as directed Daily As Needed for Dandruff., Disp: 118 mL, Rfl: 0  Patient Active Problem List    Diagnosis     Urinary urgency [R39.15]     Osteoarthritis involving multiple joints [M15.9]     Seborrheic dermatitis [L21.9]     Tinnitus of right ear [H93.11]     Hammertoe, bilateral [M20.41, M20.42]     Hand eczema [L30.9]     PE (physical exam), routine [Z00.00]     Osteopenia [M85.89]     Impaired fasting glucose [R73.01]     Onychomycosis [B35.1]     WINDY (stress urinary incontinence, female)  [N39.3]     Rectocele [N81.6]     Menopause [Z78.0]     Fibrocystic breast changes, bilateral [N60.11, N60.12]     Chronic constipation [K59.09]     Anemia [D64.9]     Hyperthyroidism due to Graves disease [E05.90]      Review of Systems   Constitutional:  Negative for activity change, appetite change and unexpected weight change.   HENT:  Negative for congestion and rhinorrhea.    Eyes:  Negative for visual disturbance.   Respiratory:  Negative for cough and shortness of breath.    Cardiovascular:  Negative for palpitations and leg swelling.   Gastrointestinal:  Negative for constipation, diarrhea and nausea.   Genitourinary:  Negative for hematuria.   Musculoskeletal:  Negative for arthralgias, back pain, gait problem, joint swelling and myalgias.   Skin:  Negative for color change, rash and wound.   Allergic/Immunologic: Negative for environmental allergies, food allergies and immunocompromised state.   Neurological:  Negative for dizziness, weakness and light-headedness.   Psychiatric/Behavioral:  Negative for confusion, decreased concentration, dysphoric mood and sleep disturbance. The patient is not nervous/anxious.      Social History     Socioeconomic History    Marital status:    Tobacco Use    Smoking status: Never    Smokeless tobacco: Never   Vaping Use    Vaping Use: Never used   Substance and Sexual Activity    Alcohol use: No    Drug use: No    Sexual activity: Yes     Partners: Male     Birth control/protection: Post-menopausal, Surgical     Family History   Problem Relation Age of Onset    Arrhythmia Mother         digoxin    Breast cancer Mother 67        mastectomy    Depression Mother     Hyperlipidemia Mother     Heart attack Father     Thyroid disease Father     Arthritis Father     Diabetes Father     Diabetes type I Father 14    Pancreatic cancer Paternal Grandmother     Pancreatitis Paternal Aunt     Ovarian cancer Neg Hx     Colon cancer Neg Hx     Lung cancer Neg Hx     Stroke Neg  "Hx      /60   Pulse 74   Ht 162.6 cm (64\")   Wt 62.8 kg (138 lb 6.4 oz)   LMP  (LMP Unknown)   SpO2 97%   BMI 23.76 kg/m²     Physical Exam  Vitals and nursing note reviewed.   Constitutional:       Appearance: Normal appearance. She is well-developed.   HENT:      Head: Normocephalic and atraumatic.   Eyes:      General: Lids are normal.      Extraocular Movements: Extraocular movements intact.      Conjunctiva/sclera: Conjunctivae normal.      Pupils: Pupils are equal, round, and reactive to light.   Neck:      Thyroid: No thyroid mass or thyromegaly.      Vascular: No carotid bruit.      Trachea: Trachea normal. No tracheal deviation.   Cardiovascular:      Rate and Rhythm: Normal rate and regular rhythm.      Pulses: Normal pulses.      Heart sounds: Normal heart sounds. No murmur heard.    No friction rub. No gallop.   Pulmonary:      Effort: Pulmonary effort is normal. No respiratory distress.      Breath sounds: Normal breath sounds. No wheezing.   Musculoskeletal:         General: No deformity. Normal range of motion.      Cervical back: Normal range of motion and neck supple.   Lymphadenopathy:      Cervical: No cervical adenopathy.   Skin:     General: Skin is warm and dry.      Findings: No erythema or rash.      Nails: There is no clubbing.   Neurological:      General: No focal deficit present.      Mental Status: She is alert and oriented to person, place, and time.      Cranial Nerves: No cranial nerve deficit.      Deep Tendon Reflexes: Reflexes are normal and symmetric. Reflexes normal.   Psychiatric:         Mood and Affect: Mood normal.         Speech: Speech normal.         Behavior: Behavior normal.         Thought Content: Thought content normal.         Judgment: Judgment normal.     Results for orders placed or performed in visit on 09/07/23   T4, Free    Specimen: Blood    Blood  Release to [facility]   Result Value Ref Range    Free T4 1.21 0.93 - 1.70 ng/dL   TSH    Specimen: Blood "    Blood  Release to mae   Result Value Ref Range    TSH 1.800 0.270 - 4.200 uIU/mL     Diagnoses and all orders for this visit:    1. Impaired fasting glucose (Primary)  Assessment & Plan:  Blood sugar and 90 day average sugar reviewed from last lab draw with A1c 5.6%   Continue diet, exercise       2. Hyperthyroidism due to Graves disease  Assessment & Plan:  Now in remission  Repeat tfts normal   Normal tsi this year   F/u annually - exam stable       Return in about 1 year (around 9/11/2024).    Karie Whitney MD  Signed Karie Whitney MD

## 2023-09-11 NOTE — ASSESSMENT & PLAN NOTE
Blood sugar and 90 day average sugar reviewed from last lab draw with A1c 5.6%   Continue diet, exercise

## 2023-12-20 DIAGNOSIS — R73.01 IMPAIRED FASTING GLUCOSE: Chronic | ICD-10-CM

## 2023-12-20 DIAGNOSIS — Z00.00 PE (PHYSICAL EXAM), ROUTINE: Primary | Chronic | ICD-10-CM

## 2023-12-20 DIAGNOSIS — E05.90 HYPERTHYROIDISM: Chronic | ICD-10-CM

## 2023-12-20 DIAGNOSIS — D64.9 ANEMIA, UNSPECIFIED TYPE: Chronic | ICD-10-CM

## 2024-01-16 PROBLEM — L29.9 SCALP PRURITUS: Status: ACTIVE | Noted: 2024-01-16

## 2024-01-24 ENCOUNTER — LAB (OUTPATIENT)
Dept: LAB | Facility: HOSPITAL | Age: 64
End: 2024-01-24
Payer: COMMERCIAL

## 2024-01-24 DIAGNOSIS — E05.90 HYPERTHYROIDISM: Chronic | ICD-10-CM

## 2024-01-24 DIAGNOSIS — D64.9 ANEMIA, UNSPECIFIED TYPE: Chronic | ICD-10-CM

## 2024-01-24 DIAGNOSIS — Z00.00 PE (PHYSICAL EXAM), ROUTINE: ICD-10-CM

## 2024-01-24 DIAGNOSIS — R73.01 IMPAIRED FASTING GLUCOSE: Chronic | ICD-10-CM

## 2024-01-24 LAB
ALBUMIN SERPL-MCNC: 4.6 G/DL (ref 3.5–5.2)
ALBUMIN/GLOB SERPL: 1.9 G/DL
ALP SERPL-CCNC: 76 U/L (ref 39–117)
ALT SERPL W P-5'-P-CCNC: 20 U/L (ref 1–33)
ANION GAP SERPL CALCULATED.3IONS-SCNC: 10 MMOL/L (ref 5–15)
AST SERPL-CCNC: 27 U/L (ref 1–32)
BACTERIA UR QL AUTO: NORMAL /HPF
BASOPHILS # BLD AUTO: 0.01 10*3/MM3 (ref 0–0.2)
BASOPHILS NFR BLD AUTO: 0.2 % (ref 0–1.5)
BILIRUB SERPL-MCNC: 0.4 MG/DL (ref 0–1.2)
BILIRUB UR QL STRIP: NEGATIVE
BUN SERPL-MCNC: 13 MG/DL (ref 8–23)
BUN/CREAT SERPL: 18.3 (ref 7–25)
CALCIUM SPEC-SCNC: 10.1 MG/DL (ref 8.6–10.5)
CHLORIDE SERPL-SCNC: 104 MMOL/L (ref 98–107)
CHOLEST SERPL-MCNC: 190 MG/DL (ref 0–200)
CLARITY UR: CLEAR
CO2 SERPL-SCNC: 29 MMOL/L (ref 22–29)
COLOR UR: YELLOW
CREAT SERPL-MCNC: 0.71 MG/DL (ref 0.57–1)
DEPRECATED RDW RBC AUTO: 40.2 FL (ref 37–54)
EGFRCR SERPLBLD CKD-EPI 2021: 95.7 ML/MIN/1.73
EOSINOPHIL # BLD AUTO: 0.12 10*3/MM3 (ref 0–0.4)
EOSINOPHIL NFR BLD AUTO: 2 % (ref 0.3–6.2)
ERYTHROCYTE [DISTWIDTH] IN BLOOD BY AUTOMATED COUNT: 12.1 % (ref 12.3–15.4)
FERRITIN SERPL-MCNC: 108 NG/ML (ref 13–150)
GLOBULIN UR ELPH-MCNC: 2.4 GM/DL
GLUCOSE SERPL-MCNC: 97 MG/DL (ref 65–99)
GLUCOSE UR STRIP-MCNC: NEGATIVE MG/DL
HBA1C MFR BLD: 5.7 % (ref 4.8–5.6)
HCT VFR BLD AUTO: 42.1 % (ref 34–46.6)
HDLC SERPL-MCNC: 72 MG/DL (ref 40–60)
HGB BLD-MCNC: 13.9 G/DL (ref 12–15.9)
HGB UR QL STRIP.AUTO: NEGATIVE
HYALINE CASTS UR QL AUTO: NORMAL /LPF
IMM GRANULOCYTES # BLD AUTO: 0.01 10*3/MM3 (ref 0–0.05)
IMM GRANULOCYTES NFR BLD AUTO: 0.2 % (ref 0–0.5)
KETONES UR QL STRIP: NEGATIVE
LDLC SERPL CALC-MCNC: 108 MG/DL (ref 0–100)
LDLC/HDLC SERPL: 1.49 {RATIO}
LEUKOCYTE ESTERASE UR QL STRIP.AUTO: NEGATIVE
LYMPHOCYTES # BLD AUTO: 1.52 10*3/MM3 (ref 0.7–3.1)
LYMPHOCYTES NFR BLD AUTO: 25.1 % (ref 19.6–45.3)
MCH RBC QN AUTO: 30.3 PG (ref 26.6–33)
MCHC RBC AUTO-ENTMCNC: 33 G/DL (ref 31.5–35.7)
MCV RBC AUTO: 91.7 FL (ref 79–97)
MONOCYTES # BLD AUTO: 0.77 10*3/MM3 (ref 0.1–0.9)
MONOCYTES NFR BLD AUTO: 12.7 % (ref 5–12)
NEUTROPHILS NFR BLD AUTO: 3.62 10*3/MM3 (ref 1.7–7)
NEUTROPHILS NFR BLD AUTO: 59.8 % (ref 42.7–76)
NITRITE UR QL STRIP: NEGATIVE
NRBC BLD AUTO-RTO: 0 /100 WBC (ref 0–0.2)
PH UR STRIP.AUTO: 7 [PH] (ref 5–8)
PLATELET # BLD AUTO: 275 10*3/MM3 (ref 140–450)
PMV BLD AUTO: 9.6 FL (ref 6–12)
POTASSIUM SERPL-SCNC: 4.2 MMOL/L (ref 3.5–5.2)
PROT SERPL-MCNC: 7 G/DL (ref 6–8.5)
PROT UR QL STRIP: NEGATIVE
RBC # BLD AUTO: 4.59 10*6/MM3 (ref 3.77–5.28)
RBC # UR STRIP: NORMAL /HPF
REF LAB TEST METHOD: NORMAL
SODIUM SERPL-SCNC: 143 MMOL/L (ref 136–145)
SP GR UR STRIP: 1.01 (ref 1–1.03)
SQUAMOUS #/AREA URNS HPF: NORMAL /HPF
T4 FREE SERPL-MCNC: 1.17 NG/DL (ref 0.93–1.7)
TRIGL SERPL-MCNC: 55 MG/DL (ref 0–150)
TSH SERPL DL<=0.05 MIU/L-ACNC: 2.34 UIU/ML (ref 0.27–4.2)
UROBILINOGEN UR QL STRIP: NORMAL
VLDLC SERPL-MCNC: 10 MG/DL (ref 5–40)
WBC # UR STRIP: NORMAL /HPF
WBC NRBC COR # BLD AUTO: 6.05 10*3/MM3 (ref 3.4–10.8)

## 2024-01-24 PROCEDURE — 84439 ASSAY OF FREE THYROXINE: CPT

## 2024-01-24 PROCEDURE — 81001 URINALYSIS AUTO W/SCOPE: CPT

## 2024-01-24 PROCEDURE — 80061 LIPID PANEL: CPT

## 2024-01-24 PROCEDURE — 82728 ASSAY OF FERRITIN: CPT

## 2024-01-24 PROCEDURE — 83036 HEMOGLOBIN GLYCOSYLATED A1C: CPT

## 2024-01-24 PROCEDURE — 80050 GENERAL HEALTH PANEL: CPT

## 2024-02-05 NOTE — ASSESSMENT & PLAN NOTE
Health maintenance - rec flu vacc and COVID19 vacc, counseling given; Tdap 3/15 (next Td due 2025, ie next yr), HAV and Shingrix done; mammo 5/8/23, f/u R 6/23, resume screening; no further Paps unless abnlities ([facility]YLOR); nl Pap 1/25/22; DEXA ordered (last 8/21); colonosc 1/22, repeat 2029 per Dr. Verdin; eye exam 1.24 Dr. Sheffield; dental exam q6 mos; (+) seat belt

## 2024-02-05 NOTE — PROGRESS NOTES
"Chief Complaint   Patient presents with    Annual Exam    Impaired fasting glucose       History of Present Illness  63 y.o.  female presents for updated phys examination and f/u on sugars.    Inquiring about donating blood. Has not donated this past year.    Inquiring about collagen and selenium supplements.    Review of Systems  Denies headaches, visual changes, CP, palpitations, SOB, cough, abd pain, n/v/d, numbness/tingling, falls, mood changes, lightheadedness, hearing changes, rashes.  Denies vaginal discharge or bleeding (no periods) or breast concerns. Goes to [facility] ovarian screening.  ROS (+) for urinary urgency. Drinks 2 cups coffee/day.   All other ROS reviewed and negative.    Current Outpatient Medications:     Calcium Carbonate-Vitamin D (OSCAL-500) 500-400 MG-UNIT QD    Cholecalciferol (VITAMIN D) 1000 UNITS QD    docusate sodium (COLACE) 250 MG BID    ibuprofen (ADVIL,MOTRIN) 800 MG tid prn    methylcellulose oral powder, Citrucel (sucrose) QD    Minoxidil (Rogaine Womens) 5 % foam AD    Multiple Vitamins-Minerals (CENTRUM PO) QD    polyethylene glycol (MIRALAX) packet, Take 17 g QD    selenium sulfide (SELSUN) 2.5 % lotion prn    VITALS:  /62   Pulse 78   Resp 16   Ht 162.6 cm (64\")   Wt 63 kg (139 lb)   LMP  (LMP Unknown)   SpO2 99%   BMI 23.86 kg/m²     Physical Exam  Vitals and nursing note reviewed.   Constitutional:       General: She is not in acute distress.     Appearance: Normal appearance. She is well-developed.   HENT:      Head: Normocephalic.      Right Ear: Tympanic membrane, ear canal and external ear normal.      Left Ear: Tympanic membrane, ear canal and external ear normal.      Nose: Nose normal.   Eyes:      Extraocular Movements: Extraocular movements intact.      Conjunctiva/sclera: Conjunctivae normal.      Pupils: Pupils are equal, round, and reactive to light.   Neck:      Vascular: No carotid bruit (bilaterally).   Cardiovascular:      Rate and Rhythm: " Normal rate and regular rhythm.      Heart sounds: Normal heart sounds.   Pulmonary:      Effort: Pulmonary effort is normal. No respiratory distress.      Breath sounds: Normal breath sounds. No wheezing, rhonchi or rales.   Abdominal:      General: Bowel sounds are normal. There is no distension.      Palpations: Abdomen is soft. There is no mass.      Tenderness: There is no abdominal tenderness.   Genitourinary:     Comments: Chaperone declined by patient.    Breast exam unremarkable without masses, skin changes, nipple discharge, or axillary adenopathy.      Musculoskeletal:      Cervical back: Normal range of motion and neck supple.      Right lower leg: No edema.      Left lower leg: No edema.   Lymphadenopathy:      Cervical: No cervical adenopathy.   Skin:     General: Skin is warm and dry.      Findings: No rash.   Neurological:      Mental Status: She is alert and oriented to person, place, and time.      Gait: Gait normal.   Psychiatric:         Mood and Affect: Mood normal.         Behavior: Behavior normal.         LABS  Results for orders placed or performed in visit on 01/24/24   Comprehensive Metabolic Panel    Specimen: Blood   Result Value Ref Range    Glucose 97 65 - 99 mg/dL    BUN 13 8 - 23 mg/dL    Creatinine 0.71 0.57 - 1.00 mg/dL    Sodium 143 136 - 145 mmol/L    Potassium 4.2 3.5 - 5.2 mmol/L    Chloride 104 98 - 107 mmol/L    CO2 29.0 22.0 - 29.0 mmol/L    Calcium 10.1 8.6 - 10.5 mg/dL    Total Protein 7.0 6.0 - 8.5 g/dL    Albumin 4.6 3.5 - 5.2 g/dL    ALT (SGPT) 20 1 - 33 U/L    AST (SGOT) 27 1 - 32 U/L    Alkaline Phosphatase 76 39 - 117 U/L    Total Bilirubin 0.4 0.0 - 1.2 mg/dL    Globulin 2.4 gm/dL    A/G Ratio 1.9 g/dL    BUN/Creatinine Ratio 18.3 7.0 - 25.0    Anion Gap 10.0 5.0 - 15.0 mmol/L    eGFR 95.7 >60.0 mL/min/1.73   Lipid Panel    Specimen: Blood   Result Value Ref Range    Total Cholesterol 190 0 - 200 mg/dL    Triglycerides 55 0 - 150 mg/dL    HDL Cholesterol 72 (H) 40 -  60 mg/dL    LDL Cholesterol  108 (H) 0 - 100 mg/dL    VLDL Cholesterol 10 5 - 40 mg/dL    LDL/HDL Ratio 1.49    TSH    Specimen: Blood   Result Value Ref Range    TSH 2.340 0.270 - 4.200 uIU/mL   Hemoglobin A1c    Specimen: Blood   Result Value Ref Range    Hemoglobin A1C 5.70 (H) 4.80 - 5.60 %   T4, Free    Specimen: Blood   Result Value Ref Range    Free T4 1.17 0.93 - 1.70 ng/dL   Ferritin    Specimen: Blood   Result Value Ref Range    Ferritin 108.00 13.00 - 150.00 ng/mL   CBC Auto Differential    Specimen: Blood   Result Value Ref Range    WBC 6.05 3.40 - 10.80 10*3/mm3    RBC 4.59 3.77 - 5.28 10*6/mm3    Hemoglobin 13.9 12.0 - 15.9 g/dL    Hematocrit 42.1 34.0 - 46.6 %    MCV 91.7 79.0 - 97.0 fL    MCH 30.3 26.6 - 33.0 pg    MCHC 33.0 31.5 - 35.7 g/dL    RDW 12.1 (L) 12.3 - 15.4 %    RDW-SD 40.2 37.0 - 54.0 fl    MPV 9.6 6.0 - 12.0 fL    Platelets 275 140 - 450 10*3/mm3    Neutrophil % 59.8 42.7 - 76.0 %    Lymphocyte % 25.1 19.6 - 45.3 %    Monocyte % 12.7 (H) 5.0 - 12.0 %    Eosinophil % 2.0 0.3 - 6.2 %    Basophil % 0.2 0.0 - 1.5 %    Immature Grans % 0.2 0.0 - 0.5 %    Neutrophils, Absolute 3.62 1.70 - 7.00 10*3/mm3    Lymphocytes, Absolute 1.52 0.70 - 3.10 10*3/mm3    Monocytes, Absolute 0.77 0.10 - 0.90 10*3/mm3    Eosinophils, Absolute 0.12 0.00 - 0.40 10*3/mm3    Basophils, Absolute 0.01 0.00 - 0.20 10*3/mm3    Immature Grans, Absolute 0.01 0.00 - 0.05 10*3/mm3    nRBC 0.0 0.0 - 0.2 /100 WBC   Urinalysis without microscopic (no culture) - Urine, Clean Catch    Specimen: Urine, Clean Catch   Result Value Ref Range    Color, UA Yellow Yellow, Straw    Appearance, UA Clear Clear    pH, UA 7.0 5.0 - 8.0    Specific Gravity, UA 1.015 1.005 - 1.030    Glucose, UA Negative Negative    Ketones, UA Negative Negative    Bilirubin, UA Negative Negative    Blood, UA Negative Negative    Protein, UA Negative Negative    Leuk Esterase, UA Negative Negative    Nitrite, UA Negative Negative    Urobilinogen, UA 0.2  E.U./dL 0.2 - 1.0 E.U./dL   Urinalysis, Microscopic Only - Urine, Clean Catch    Specimen: Urine, Clean Catch   Result Value Ref Range    RBC, UA 0-2 None Seen, 0-2 /HPF    WBC, UA 0-2 None Seen, 0-2 /HPF    Bacteria, UA None Seen None Seen /HPF    Squamous Epithelial Cells, UA 0-2 None Seen, 0-2 /HPF    Hyaline Casts, UA None Seen None Seen /LPF    Methodology Automated Microscopy      1/23 A1C 5.6    ASSESSMENT/PLAN    Diagnoses and all orders for this visit:    1. PE (physical exam), routine (Primary)  Assessment & Plan:  Health maintenance - rec flu vacc and COVID19 vacc, counseling given; Tdap 3/15 (next Td due 2025, ie next yr), HAV and Shingrix done; mammo 5/8/23, f/u R 6/23, resume screening; no further Paps unless abnlities (TAYLOR); nl Pap 1/25/22; DEXA ordered (last 8/21); colonosc 1/22, repeat 2029 per Dr. Verdin; eye exam 1.24 Dr. Sheffield; dental exam q6 mos; (+) seat belt    Orders:  -     COVID-19 F23 (Pfizer) 12yrs+ (COMIRNATY)    2. Impaired fasting glucose  Assessment & Plan:  BG control stable with A1C 5.7; encouraged reg phys activity to decr insulin resistance, moderation in unhealthy starches/sweets; f/u A1C in 6 mos        3. Osteopenia  Assessment & Plan:  Calcium and vitamin D supplementation with weight-bearing exercise; DEXA ordered         4. Menopause  -     DEXA Bone Density Axial; Future    5. Seborrheic dermatitis  -     selenium sulfide (SELSUN) 2.5 % lotion; Apply  topically to the appropriate area as directed Daily As Needed for Dandruff.  Dispense: 118 mL; Refill: 0    6. Urinary urgency  Assessment & Plan:  Discussed minimizing caffeine and beverages that have diuretic effects; reviewed role of medications, side effects, goals; no RX today      7. Anemia, unspecified type  Assessment & Plan:  Blood counts remain normal; ok to donate blood 2x/yr          FOLLOW-UP  RTC 6 mos with A1C    Electronically signed by:    Opal Fournier MD, FACP  02/06/2024

## 2024-02-06 ENCOUNTER — TELEPHONE (OUTPATIENT)
Dept: INTERNAL MEDICINE | Facility: CLINIC | Age: 64
End: 2024-02-06

## 2024-02-06 ENCOUNTER — OFFICE VISIT (OUTPATIENT)
Dept: INTERNAL MEDICINE | Facility: CLINIC | Age: 64
End: 2024-02-06
Payer: COMMERCIAL

## 2024-02-06 VITALS
RESPIRATION RATE: 16 BRPM | HEART RATE: 78 BPM | HEIGHT: 64 IN | WEIGHT: 139 LBS | DIASTOLIC BLOOD PRESSURE: 62 MMHG | BODY MASS INDEX: 23.73 KG/M2 | OXYGEN SATURATION: 99 % | SYSTOLIC BLOOD PRESSURE: 114 MMHG

## 2024-02-06 DIAGNOSIS — Z00.00 PE (PHYSICAL EXAM), ROUTINE: Primary | ICD-10-CM

## 2024-02-06 DIAGNOSIS — R39.15 URINARY URGENCY: ICD-10-CM

## 2024-02-06 DIAGNOSIS — R73.01 IMPAIRED FASTING GLUCOSE: Chronic | ICD-10-CM

## 2024-02-06 DIAGNOSIS — L21.9 SEBORRHEIC DERMATITIS: ICD-10-CM

## 2024-02-06 DIAGNOSIS — M85.89 OSTEOPENIA OF MULTIPLE SITES: Chronic | ICD-10-CM

## 2024-02-06 DIAGNOSIS — Z78.0 MENOPAUSE: Chronic | ICD-10-CM

## 2024-02-06 DIAGNOSIS — D64.9 ANEMIA, UNSPECIFIED TYPE: Chronic | ICD-10-CM

## 2024-02-06 RX ORDER — SELENIUM SULFIDE 2.5 MG/100ML
LOTION TOPICAL DAILY PRN
Qty: 118 ML | Refills: 0 | Status: SHIPPED | OUTPATIENT
Start: 2024-02-06

## 2024-02-06 NOTE — TELEPHONE ENCOUNTER
PT STATES SHE WILL CALL THE OFFICE LATER AND SCHEDULE HER FOLLOW UPS   PT STATES SHE HAS TO BE SOMEWHERE IN 10MINS

## 2024-02-10 NOTE — ASSESSMENT & PLAN NOTE
Discussed minimizing caffeine and beverages that have diuretic effects; reviewed role of medications, side effects, goals; no RX today

## 2024-02-12 ENCOUNTER — TRANSCRIBE ORDERS (OUTPATIENT)
Dept: INTERNAL MEDICINE | Facility: CLINIC | Age: 64
End: 2024-02-12
Payer: COMMERCIAL

## 2024-02-12 DIAGNOSIS — Z12.31 SCREENING MAMMOGRAM FOR BREAST CANCER: Primary | ICD-10-CM

## 2024-03-20 DIAGNOSIS — E05.90 HYPERTHYROIDISM: Primary | Chronic | ICD-10-CM

## 2024-03-25 ENCOUNTER — HOSPITAL ENCOUNTER (OUTPATIENT)
Dept: BONE DENSITY | Facility: HOSPITAL | Age: 64
Discharge: HOME | End: 2024-03-25
Payer: COMMERCIAL

## 2024-03-25 ENCOUNTER — LAB (OUTPATIENT)
Dept: LAB | Facility: HOSPITAL | Age: 64
End: 2024-03-25
Payer: COMMERCIAL

## 2024-03-25 DIAGNOSIS — E05.90 HYPERTHYROIDISM: Chronic | ICD-10-CM

## 2024-03-25 DIAGNOSIS — Z78.0 MENOPAUSE: Chronic | ICD-10-CM

## 2024-03-25 PROCEDURE — 84480 ASSAY TRIIODOTHYRONINE (T3): CPT

## 2024-03-25 PROCEDURE — 84439 ASSAY OF FREE THYROXINE: CPT

## 2024-03-25 PROCEDURE — 77080 DXA BONE DENSITY AXIAL: CPT

## 2024-03-25 PROCEDURE — 84443 ASSAY THYROID STIM HORMONE: CPT

## 2024-03-26 LAB
T3 SERPL-MCNC: 120 NG/DL (ref 80–200)
T4 FREE SERPL-MCNC: 1.15 NG/DL (ref 0.93–1.7)
TSH SERPL DL<=0.05 MIU/L-ACNC: 2.11 UIU/ML (ref 0.27–4.2)

## 2024-03-28 ENCOUNTER — TELEPHONE (OUTPATIENT)
Dept: INTERNAL MEDICINE | Facility: CLINIC | Age: 64
End: 2024-03-28
Payer: COMMERCIAL

## 2024-03-28 NOTE — TELEPHONE ENCOUNTER
----- Message from Opal Fournier MD sent at 3/28/2024  1:19 PM EDT -----  Dear Falguni,    Thank you for obtaining your DEXA (bone density) scan.  I have received those results and would like to review them with you.      Your bone density remains in the osteopenic range, but it is decreased compared to your last DEXA in 2021.  Osteopenia is between normal and osteoporosis.      The values indicate that your risk of a major fracture in the next 10 years is 17%. Treatment is recommended when this value reaches 20% or more.    Please maintain regular calcium and vitamin D supplementation.  Calcium intake should be 1000mg per day between diet and supplements.  Weight bearing exercise is good for bone health as well.    We should plan to repeat your DEXA in 2 years.  Please let me know if you have any questions or concerns regarding these results.         Sincerely,  Opal Fournier MD, FACP

## 2024-04-15 ENCOUNTER — TELEPHONE (OUTPATIENT)
Dept: ENDOCRINOLOGY | Facility: CLINIC | Age: 64
End: 2024-04-15
Payer: COMMERCIAL

## 2024-04-15 NOTE — TELEPHONE ENCOUNTER
Pt states she sent to [facility] lab for recent lab drawn ordered by Dr Whitney.  She is now being billed for $400.00 because the blood was sent to [facility] lab instead of [facility].  She states the same thing happened about 2 years ago and she spoke with Karli and she was able to get it straightened out.  Pt requesting to speak with Karli about this

## 2024-04-15 NOTE — TELEPHONE ENCOUNTER
Caller: Falguni Alonzo    Relationship to patient: Self    Best call back number: [phone]     Patient is needing: PT REQUESTS CALL BACK FROM [facility] TO DISCUSS LABS FROM 3/25/24. PLEASE CALL TO ADVISE.

## 2024-04-24 ENCOUNTER — TELEPHONE (OUTPATIENT)
Dept: ENDOCRINOLOGY | Facility: CLINIC | Age: 64
End: 2024-04-24

## 2024-04-24 NOTE — TELEPHONE ENCOUNTER
Caller: Falguni Alonzo    Relationship: Self    Best call back number: [phone]    What is the best time to reach you: ANYTIME     Who are you requesting to speak with (clinical staff, provider,  specific staff member): SPEC STAFF     Do you know the name of the person who called: VERONICA     What was the call regarding: PT HAS AN ADDITIONAL QUESTION, APOLOGIZES FOR MISSING YOUR CALL, SHE WAS IN A MEETING. PLEASE GIVE PT A CALL BACK     Is it okay if the provider responds through MyChart: NO

## 2024-08-11 LAB
NCCN CRITERIA FLAG: NORMAL
TYRER CUZICK SCORE: 12.8

## 2024-08-12 ENCOUNTER — TELEPHONE (OUTPATIENT)
Dept: ENDOCRINOLOGY | Facility: CLINIC | Age: 64
End: 2024-08-12
Payer: COMMERCIAL

## 2024-08-12 DIAGNOSIS — D64.9 ANEMIA, UNSPECIFIED TYPE: Chronic | ICD-10-CM

## 2024-08-12 DIAGNOSIS — R73.01 IMPAIRED FASTING GLUCOSE: Primary | Chronic | ICD-10-CM

## 2024-08-12 DIAGNOSIS — E05.90 HYPERTHYROIDISM: Chronic | ICD-10-CM

## 2024-08-12 NOTE — TELEPHONE ENCOUNTER
PATIENT IS REQUESTING TO HAVE LABS DONE PRIOR TO HER NEXT APPOINTMENT ON 09/09/24 WITH DR. STREET.     SHE IS REQUESTING TO HAVE T3, T4, TSH, A1C, IRON, AND HEMOGLOBIN TESTED. SHE STATES DR. OH, HER PRIMARY CARE PROVIDER WAS WANTING HER TO CHECK HER A1C IN 6 MONTHS FROM HER LAST APPOINTMENT WHICH WAS THIS PAST FEBRUARY, BUT SHE WOULD RATHER HAVE DR. STREET CHECK INSTEAD.       PLEASE CALL PATIENT [phone]  AND CONFIRM WHEN ORDERS HAVE BEEN PUT IN.

## 2024-08-13 NOTE — TELEPHONE ENCOUNTER
Please let her know orders have been created - she can go to any [facility] draw station to have done or come here on Thursday/Friday  Thanks, marleen

## 2024-08-26 ENCOUNTER — HOSPITAL ENCOUNTER (OUTPATIENT)
Dept: MAMMOGRAPHY | Facility: HOSPITAL | Age: 64
Discharge: HOME | End: 2024-08-26
Admitting: INTERNAL MEDICINE
Payer: COMMERCIAL

## 2024-08-26 DIAGNOSIS — Z12.31 SCREENING MAMMOGRAM FOR BREAST CANCER: ICD-10-CM

## 2024-08-26 PROCEDURE — 77063 BREAST TOMOSYNTHESIS BI: CPT

## 2024-08-26 PROCEDURE — 77067 SCR MAMMO BI INCL CAD: CPT

## 2024-09-05 ENCOUNTER — LAB (OUTPATIENT)
Dept: ENDOCRINOLOGY | Facility: CLINIC | Age: 64
End: 2024-09-05
Payer: COMMERCIAL

## 2024-09-05 DIAGNOSIS — R73.01 IMPAIRED FASTING GLUCOSE: Chronic | ICD-10-CM

## 2024-09-05 DIAGNOSIS — D64.9 ANEMIA, UNSPECIFIED TYPE: Chronic | ICD-10-CM

## 2024-09-05 DIAGNOSIS — E05.90 HYPERTHYROIDISM: Chronic | ICD-10-CM

## 2024-09-05 LAB
25(OH)D3 SERPL-MCNC: 63.1 NG/ML (ref 30–100)
ALBUMIN SERPL-MCNC: 4.4 G/DL (ref 3.5–5.2)
ALBUMIN/GLOB SERPL: 1.8 G/DL
ALP SERPL-CCNC: 77 U/L (ref 39–117)
ALT SERPL W P-5'-P-CCNC: 18 U/L (ref 1–33)
ANION GAP SERPL CALCULATED.3IONS-SCNC: 9.8 MMOL/L (ref 5–15)
AST SERPL-CCNC: 26 U/L (ref 1–32)
BASOPHILS # BLD AUTO: 0.03 10*3/MM3 (ref 0–0.2)
BASOPHILS NFR BLD AUTO: 0.6 % (ref 0–1.5)
BILIRUB SERPL-MCNC: 0.3 MG/DL (ref 0–1.2)
BUN SERPL-MCNC: 21 MG/DL (ref 8–23)
BUN/CREAT SERPL: 29.6 (ref 7–25)
CALCIUM SPEC-SCNC: 10.4 MG/DL (ref 8.6–10.5)
CHLORIDE SERPL-SCNC: 103 MMOL/L (ref 98–107)
CO2 SERPL-SCNC: 28.2 MMOL/L (ref 22–29)
CREAT SERPL-MCNC: 0.71 MG/DL (ref 0.57–1)
DEPRECATED RDW RBC AUTO: 43.6 FL (ref 37–54)
EGFRCR SERPLBLD CKD-EPI 2021: 95.1 ML/MIN/1.73
EOSINOPHIL # BLD AUTO: 0.09 10*3/MM3 (ref 0–0.4)
EOSINOPHIL NFR BLD AUTO: 1.9 % (ref 0.3–6.2)
ERYTHROCYTE [DISTWIDTH] IN BLOOD BY AUTOMATED COUNT: 12.3 % (ref 12.3–15.4)
GLOBULIN UR ELPH-MCNC: 2.5 GM/DL
GLUCOSE SERPL-MCNC: 93 MG/DL (ref 65–99)
HBA1C MFR BLD: 5.4 % (ref 4.8–5.6)
HCT VFR BLD AUTO: 41.3 % (ref 34–46.6)
HGB BLD-MCNC: 13.5 G/DL (ref 12–15.9)
IMM GRANULOCYTES # BLD AUTO: 0.01 10*3/MM3 (ref 0–0.05)
IMM GRANULOCYTES NFR BLD AUTO: 0.2 % (ref 0–0.5)
IRON 24H UR-MRATE: 76 MCG/DL (ref 37–145)
LYMPHOCYTES # BLD AUTO: 1.81 10*3/MM3 (ref 0.7–3.1)
LYMPHOCYTES NFR BLD AUTO: 38.6 % (ref 19.6–45.3)
MCH RBC QN AUTO: 31.5 PG (ref 26.6–33)
MCHC RBC AUTO-ENTMCNC: 32.7 G/DL (ref 31.5–35.7)
MCV RBC AUTO: 96.3 FL (ref 79–97)
MONOCYTES # BLD AUTO: 0.54 10*3/MM3 (ref 0.1–0.9)
MONOCYTES NFR BLD AUTO: 11.5 % (ref 5–12)
NEUTROPHILS NFR BLD AUTO: 2.21 10*3/MM3 (ref 1.7–7)
NEUTROPHILS NFR BLD AUTO: 47.2 % (ref 42.7–76)
NRBC BLD AUTO-RTO: 0 /100 WBC (ref 0–0.2)
PLATELET # BLD AUTO: 258 10*3/MM3 (ref 140–450)
PMV BLD AUTO: 9.6 FL (ref 6–12)
POTASSIUM SERPL-SCNC: 4.2 MMOL/L (ref 3.5–5.2)
PROT SERPL-MCNC: 6.9 G/DL (ref 6–8.5)
RBC # BLD AUTO: 4.29 10*6/MM3 (ref 3.77–5.28)
SODIUM SERPL-SCNC: 141 MMOL/L (ref 136–145)
T3FREE SERPL-MCNC: 2.81 PG/ML (ref 2–4.4)
T4 FREE SERPL-MCNC: 1.07 NG/DL (ref 0.92–1.68)
TSH SERPL DL<=0.05 MIU/L-ACNC: 3.03 UIU/ML (ref 0.27–4.2)
WBC NRBC COR # BLD AUTO: 4.69 10*3/MM3 (ref 3.4–10.8)

## 2024-09-05 PROCEDURE — 83540 ASSAY OF IRON: CPT | Performed by: INTERNAL MEDICINE

## 2024-09-05 PROCEDURE — 86800 THYROGLOBULIN ANTIBODY: CPT | Performed by: INTERNAL MEDICINE

## 2024-09-05 PROCEDURE — 83036 HEMOGLOBIN GLYCOSYLATED A1C: CPT | Performed by: INTERNAL MEDICINE

## 2024-09-05 PROCEDURE — 80050 GENERAL HEALTH PANEL: CPT | Performed by: INTERNAL MEDICINE

## 2024-09-05 PROCEDURE — 84445 ASSAY OF TSI GLOBULIN: CPT | Performed by: INTERNAL MEDICINE

## 2024-09-05 PROCEDURE — 36415 COLL VENOUS BLD VENIPUNCTURE: CPT | Performed by: INTERNAL MEDICINE

## 2024-09-05 PROCEDURE — 84481 FREE ASSAY (FT-3): CPT | Performed by: INTERNAL MEDICINE

## 2024-09-05 PROCEDURE — 86376 MICROSOMAL ANTIBODY EACH: CPT | Performed by: INTERNAL MEDICINE

## 2024-09-05 PROCEDURE — 82306 VITAMIN D 25 HYDROXY: CPT | Performed by: INTERNAL MEDICINE

## 2024-09-05 PROCEDURE — 84439 ASSAY OF FREE THYROXINE: CPT | Performed by: INTERNAL MEDICINE

## 2024-09-06 LAB
THYROGLOB AB SERPL-ACNC: 2.4 IU/ML (ref 0–0.9)
THYROPEROXIDASE AB SERPL-ACNC: 248 IU/ML (ref 0–34)

## 2024-09-08 LAB — TSI SER-ACNC: 0.42 IU/L (ref 0–0.55)

## 2024-09-09 ENCOUNTER — OFFICE VISIT (OUTPATIENT)
Dept: ENDOCRINOLOGY | Facility: CLINIC | Age: 64
End: 2024-09-09
Payer: COMMERCIAL

## 2024-09-09 VITALS
SYSTOLIC BLOOD PRESSURE: 118 MMHG | HEIGHT: 64 IN | BODY MASS INDEX: 23.05 KG/M2 | DIASTOLIC BLOOD PRESSURE: 70 MMHG | WEIGHT: 135 LBS | HEART RATE: 75 BPM | OXYGEN SATURATION: 99 %

## 2024-09-09 DIAGNOSIS — R25.1 TREMOR: ICD-10-CM

## 2024-09-09 DIAGNOSIS — M15.9 PRIMARY OSTEOARTHRITIS INVOLVING MULTIPLE JOINTS: Chronic | ICD-10-CM

## 2024-09-09 DIAGNOSIS — E06.3 HASHIMOTO'S THYROIDITIS: Primary | ICD-10-CM

## 2024-09-09 DIAGNOSIS — E05.90 HYPERTHYROIDISM: Chronic | ICD-10-CM

## 2024-09-09 PROCEDURE — 99214 OFFICE O/P EST MOD 30 MIN: CPT | Performed by: INTERNAL MEDICINE

## 2024-09-09 NOTE — ASSESSMENT & PLAN NOTE
TSI higher but still normal  Update tfts in 8-12 weeks- tsh is rising and hashimotos ab are high as well

## 2024-09-09 NOTE — ASSESSMENT & PLAN NOTE
Thought to have psoriatic nail changes - discussed possibility of psoriatic arthritis as well - she states currently low pain level mostly related to hands

## 2024-09-09 NOTE — PROGRESS NOTES
Falguni Lopez 64 y.o.  CC: follow up hyperthyroid/history of Graves Disease     [place]: follow up hyperthyroid/history of Graves Disease   Is taking vitamin D supplement    Results for orders placed or performed in visit on 09/05/24   Comprehensive Metabolic Panel    Specimen: Blood   Result Value Ref Range    Glucose 93 65 - 99 mg/dL    BUN 21 8 - 23 mg/dL    Creatinine 0.71 0.57 - 1.00 mg/dL    Sodium 141 136 - 145 mmol/L    Potassium 4.2 3.5 - 5.2 mmol/L    Chloride 103 98 - 107 mmol/L    CO2 28.2 22.0 - 29.0 mmol/L    Calcium 10.4 8.6 - 10.5 mg/dL    Total Protein 6.9 6.0 - 8.5 g/dL    Albumin 4.4 3.5 - 5.2 g/dL    ALT (SGPT) 18 1 - 33 U/L    AST (SGOT) 26 1 - 32 U/L    Alkaline Phosphatase 77 39 - 117 U/L    Total Bilirubin 0.3 0.0 - 1.2 mg/dL    Globulin 2.5 gm/dL    A/G Ratio 1.8 g/dL    BUN/Creatinine Ratio 29.6 (H) 7.0 - 25.0    Anion Gap 9.8 5.0 - 15.0 mmol/L    eGFR 95.1 >60.0 mL/min/1.73   CBC Auto Differential    Specimen: Blood   Result Value Ref Range    WBC 4.69 3.40 - 10.80 10*3/mm3    RBC 4.29 3.77 - 5.28 10*6/mm3    Hemoglobin 13.5 12.0 - 15.9 g/dL    Hematocrit 41.3 34.0 - 46.6 %    MCV 96.3 79.0 - 97.0 fL    MCH 31.5 26.6 - 33.0 pg    MCHC 32.7 31.5 - 35.7 g/dL    RDW 12.3 12.3 - 15.4 %    RDW-SD 43.6 37.0 - 54.0 fl    MPV 9.6 6.0 - 12.0 fL    Platelets 258 140 - 450 10*3/mm3    Neutrophil % 47.2 42.7 - 76.0 %    Lymphocyte % 38.6 19.6 - 45.3 %    Monocyte % 11.5 5.0 - 12.0 %    Eosinophil % 1.9 0.3 - 6.2 %    Basophil % 0.6 0.0 - 1.5 %    Immature Grans % 0.2 0.0 - 0.5 %    Neutrophils, Absolute 2.21 1.70 - 7.00 10*3/mm3    Lymphocytes, Absolute 1.81 0.70 - 3.10 10*3/mm3    Monocytes, Absolute 0.54 0.10 - 0.90 10*3/mm3    Eosinophils, Absolute 0.09 0.00 - 0.40 10*3/mm3    Basophils, Absolute 0.03 0.00 - 0.20 10*3/mm3    Immature Grans, Absolute 0.01 0.00 - 0.05 10*3/mm3    nRBC 0.0 0.0 - 0.2 /100 WBC   TSH    Specimen: Blood   Result Value Ref Range    TSH 3.030 0.270 - 4.200 uIU/mL   T4,  Free    Specimen: Blood   Result Value Ref Range    Free T4 1.07 0.92 - 1.68 ng/dL   T3, Free    Specimen: Blood   Result Value Ref Range    T3, Free 2.81 2.00 - 4.40 pg/mL   Thyroid Stimulating Immunoglobulin    Specimen: Arm, Left; Blood   Result Value Ref Range    Thyroid Stimulating Immunoglobulin 0.42 0.00 - 0.55 IU/L   Thyroid Antibodies    Specimen: Blood   Result Value Ref Range    Thyroid Peroxidase Antibody 248 (H) 0 - 34 IU/mL    Thyroglobulin Ab 2.4 (H) 0.0 - 0.9 IU/mL   Vitamin D,25-Hydroxy    Specimen: Blood   Result Value Ref Range    25 Hydroxy, Vitamin D 63.1 30.0 - 100.0 ng/ml   Iron    Specimen: Blood   Result Value Ref Range    Iron 76 37 - 145 mcg/dL   Hemoglobin A1c    Specimen: Blood   Result Value Ref Range    Hemoglobin A1C 5.40 4.80 - 5.60 %     Higher tsh discussed  Pos hashimotos antibodies discussed  Recommended repeat tfts in 8-12 weeks    Allergies   Allergen Reactions    Erythromycin Hives and Rash       Current Outpatient Medications:     Calcium Carbonate-Vitamin D (OSCAL-500) 500-400 MG-UNIT per tablet, Take 1 tablet by mouth., Disp: , Rfl:     Cholecalciferol (VITAMIN D) 1000 UNITS tablet, Take 1 tablet by mouth Daily., Disp: , Rfl:     docusate sodium (COLACE) 250 MG capsule, Take 1 capsule by mouth 2 (Two) Times a Day., Disp: , Rfl:     ibuprofen (ADVIL,MOTRIN) 800 MG tablet, 1 PO TID prn with food, Disp: 60 tablet, Rfl: 0    methylcellulose oral powder, Citrucel (sucrose) oral powder  Daily, Disp: , Rfl:     Minoxidil (Rogaine Womens) 5 % foam, Rogaine 5 % topical foam  Every day, Disp: , Rfl:     Multiple Vitamins-Minerals (CENTRUM PO), Take 1 tablet by mouth Daily., Disp: , Rfl:     polyethylene glycol (MIRALAX) packet, Take 17 g by mouth Daily., Disp: , Rfl:     selenium sulfide (SELSUN) 2.5 % lotion, Apply  topically to the appropriate area as directed Daily As Needed for Dandruff., Disp: 118 mL, Rfl: 0    Patient Active Problem List    Diagnosis     Tremor [R25.1]      Scalp pruritus [L29.9]     Urinary urgency [R39.15]     Osteoarthritis involving multiple joints [M15.9]     Seborrheic dermatitis [L21.9]     Tinnitus of right ear [H93.11]     Hammertoe, bilateral [M20.41, M20.42]     Hand eczema [L30.9]     PE (physical exam), routine [Z00.00]     Osteopenia [M85.89]     Impaired fasting glucose [R73.01]     Onychomycosis [B35.1]     WINDY (stress urinary incontinence, female) [N39.3]     Rectocele [N81.6]     Menopause [Z78.0]     Fibrocystic breast changes, bilateral [N60.11, N60.12]     Chronic constipation [K59.09]     Anemia [D64.9]     Hyperthyroidism due to Graves disease [E05.90]      Review of Systems   Constitutional:  Negative for activity change, appetite change and unexpected weight change.   HENT:  Negative for congestion and rhinorrhea.    Eyes:  Negative for visual disturbance.   Respiratory:  Negative for cough and shortness of breath.    Cardiovascular:  Negative for palpitations and leg swelling.   Gastrointestinal:  Negative for constipation, diarrhea and nausea.   Genitourinary:  Negative for hematuria.   Musculoskeletal:  Negative for arthralgias, back pain, gait problem, joint swelling and myalgias.   Skin:  Negative for color change, rash and wound.        Psoriatic nail changes    Allergic/Immunologic: Negative for environmental allergies, food allergies and immunocompromised state.   Neurological:  Negative for dizziness, weakness and light-headedness.   Psychiatric/Behavioral:  Negative for confusion, decreased concentration, dysphoric mood and sleep disturbance. The patient is not nervous/anxious.        Social History     Socioeconomic History    Marital status:    Tobacco Use    Smoking status: Never    Smokeless tobacco: Never   Vaping Use    Vaping status: Never Used   Substance and Sexual Activity    Alcohol use: No    Drug use: No    Sexual activity: Yes     Partners: Male     Birth control/protection: Post-menopausal, Surgical     Family  "History   Problem Relation Age of Onset    Arrhythmia Mother         digoxin    Breast cancer Mother 67        mastectomy    Depression Mother     Hyperlipidemia Mother     Heart attack Father     Thyroid disease Father     Arthritis Father     Diabetes Father     Diabetes type I Father 14    Pancreatic cancer Paternal Grandmother     Pancreatitis Paternal Aunt     Ovarian cancer Neg Hx     Colon cancer Neg Hx     Lung cancer Neg Hx     Stroke Neg Hx      /70 (BP Location: Right arm, Patient Position: Sitting, Cuff Size: Adult)   Pulse 75   Ht 162.6 cm (64\")   Wt 61.2 kg (135 lb)   LMP  (LMP Unknown)   SpO2 99%   BMI 23.17 kg/m²   Physical Exam  Vitals and nursing note reviewed.   Constitutional:       Appearance: Normal appearance. She is well-developed.   HENT:      Head: Normocephalic and atraumatic.   Eyes:      General: Lids are normal.      Extraocular Movements: Extraocular movements intact.      Conjunctiva/sclera: Conjunctivae normal.      Pupils: Pupils are equal, round, and reactive to light.   Neck:      Thyroid: No thyroid mass or thyromegaly.      Vascular: No carotid bruit.      Trachea: Trachea normal. No tracheal deviation.   Cardiovascular:      Rate and Rhythm: Normal rate and regular rhythm.      Pulses: Normal pulses.      Heart sounds: Normal heart sounds. No murmur heard.     No friction rub. No gallop.   Pulmonary:      Effort: Pulmonary effort is normal. No respiratory distress.      Breath sounds: Normal breath sounds. No wheezing.   Musculoskeletal:         General: No deformity. Normal range of motion.      Cervical back: Normal range of motion and neck supple.   Lymphadenopathy:      Cervical: No cervical adenopathy.   Skin:     General: Skin is warm and dry.      Findings: No erythema or rash.      Nails: There is no clubbing.   Neurological:      General: No focal deficit present.      Mental Status: She is alert and oriented to person, place, and time.      Cranial Nerves: " No cranial nerve deficit.      Deep Tendon Reflexes: Reflexes are normal and symmetric. Reflexes normal.   Psychiatric:         Mood and Affect: Mood normal.         Speech: Speech normal.         Behavior: Behavior normal.         Thought Content: Thought content normal.         Judgment: Judgment normal.       Results for orders placed or performed in visit on 09/05/24   Comprehensive Metabolic Panel    Specimen: Blood   Result Value Ref Range    Glucose 93 65 - 99 mg/dL    BUN 21 8 - 23 mg/dL    Creatinine 0.71 0.57 - 1.00 mg/dL    Sodium 141 136 - 145 mmol/L    Potassium 4.2 3.5 - 5.2 mmol/L    Chloride 103 98 - 107 mmol/L    CO2 28.2 22.0 - 29.0 mmol/L    Calcium 10.4 8.6 - 10.5 mg/dL    Total Protein 6.9 6.0 - 8.5 g/dL    Albumin 4.4 3.5 - 5.2 g/dL    ALT (SGPT) 18 1 - 33 U/L    AST (SGOT) 26 1 - 32 U/L    Alkaline Phosphatase 77 39 - 117 U/L    Total Bilirubin 0.3 0.0 - 1.2 mg/dL    Globulin 2.5 gm/dL    A/G Ratio 1.8 g/dL    BUN/Creatinine Ratio 29.6 (H) 7.0 - 25.0    Anion Gap 9.8 5.0 - 15.0 mmol/L    eGFR 95.1 >60.0 mL/min/1.73   CBC Auto Differential    Specimen: Blood   Result Value Ref Range    WBC 4.69 3.40 - 10.80 10*3/mm3    RBC 4.29 3.77 - 5.28 10*6/mm3    Hemoglobin 13.5 12.0 - 15.9 g/dL    Hematocrit 41.3 34.0 - 46.6 %    MCV 96.3 79.0 - 97.0 fL    MCH 31.5 26.6 - 33.0 pg    MCHC 32.7 31.5 - 35.7 g/dL    RDW 12.3 12.3 - 15.4 %    RDW-SD 43.6 37.0 - 54.0 fl    MPV 9.6 6.0 - 12.0 fL    Platelets 258 140 - 450 10*3/mm3    Neutrophil % 47.2 42.7 - 76.0 %    Lymphocyte % 38.6 19.6 - 45.3 %    Monocyte % 11.5 5.0 - 12.0 %    Eosinophil % 1.9 0.3 - 6.2 %    Basophil % 0.6 0.0 - 1.5 %    Immature Grans % 0.2 0.0 - 0.5 %    Neutrophils, Absolute 2.21 1.70 - 7.00 10*3/mm3    Lymphocytes, Absolute 1.81 0.70 - 3.10 10*3/mm3    Monocytes, Absolute 0.54 0.10 - 0.90 10*3/mm3    Eosinophils, Absolute 0.09 0.00 - 0.40 10*3/mm3    Basophils, Absolute 0.03 0.00 - 0.20 10*3/mm3    Immature Grans, Absolute 0.01 0.00 -  0.05 10*3/mm3    nRBC 0.0 0.0 - 0.2 /100 WBC   TSH    Specimen: Blood   Result Value Ref Range    TSH 3.030 0.270 - 4.200 uIU/mL   T4, Free    Specimen: Blood   Result Value Ref Range    Free T4 1.07 0.92 - 1.68 ng/dL   T3, Free    Specimen: Blood   Result Value Ref Range    T3, Free 2.81 2.00 - 4.40 pg/mL   Thyroid Stimulating Immunoglobulin    Specimen: Arm, Left; Blood   Result Value Ref Range    Thyroid Stimulating Immunoglobulin 0.42 0.00 - 0.55 IU/L   Thyroid Antibodies    Specimen: Blood   Result Value Ref Range    Thyroid Peroxidase Antibody 248 (H) 0 - 34 IU/mL    Thyroglobulin Ab 2.4 (H) 0.0 - 0.9 IU/mL   Vitamin D,25-Hydroxy    Specimen: Blood   Result Value Ref Range    25 Hydroxy, Vitamin D 63.1 30.0 - 100.0 ng/ml   Iron    Specimen: Blood   Result Value Ref Range    Iron 76 37 - 145 mcg/dL   Hemoglobin A1c    Specimen: Blood   Result Value Ref Range    Hemoglobin A1C 5.40 4.80 - 5.60 %     Diagnoses and all orders for this visit:    1. Hashimoto's thyroiditis (Primary)  -     TSH+Free T4; Future    2. Tremor  Assessment & Plan:  With normal tfts doubt this is related to thyroid   Continue to monitor       3. Osteoarthritis involving multiple joints  Assessment & Plan:  Thought to have psoriatic nail changes - discussed possibility of psoriatic arthritis as well - she states currently low pain level mostly related to hands       4. Hyperthyroidism due to Graves disease  Assessment & Plan:  TSI higher but still normal  Update tfts in 8-12 weeks- tsh is rising and hashimotos ab are high as well       Return in about 1 year (around 9/9/2025) for Recheck.    Electronically signed by: Karie Whitney MD

## 2024-09-18 PROBLEM — L40.0 PSORIASIS VULGARIS: Status: ACTIVE | Noted: 2024-09-18

## 2024-09-18 PROBLEM — L40.0 PSORIASIS VULGARIS: Chronic | Status: ACTIVE | Noted: 2024-09-18

## 2024-09-18 PROBLEM — L24.4 IRRITANT CONTACT DERMATITIS DUE TO DRUG IN CONTACT WITH SKIN: Status: ACTIVE | Noted: 2024-09-18

## 2024-11-18 ENCOUNTER — PATIENT MESSAGE (OUTPATIENT)
Dept: INTERNAL MEDICINE | Facility: CLINIC | Age: 64
End: 2024-11-18
Payer: COMMERCIAL

## 2024-11-18 DIAGNOSIS — T30.0 BURN: Primary | ICD-10-CM

## 2024-11-21 RX ORDER — SILVER SULFADIAZINE 10 MG/G
1 CREAM TOPICAL 2 TIMES DAILY
Qty: 25 G | Refills: 0 | Status: SHIPPED | OUTPATIENT
Start: 2024-11-21

## 2024-12-05 ENCOUNTER — LAB (OUTPATIENT)
Dept: ENDOCRINOLOGY | Facility: CLINIC | Age: 64
End: 2024-12-05
Payer: COMMERCIAL

## 2024-12-05 DIAGNOSIS — E06.3 HASHIMOTO'S THYROIDITIS: ICD-10-CM

## 2024-12-05 LAB
T4 FREE SERPL-MCNC: 1.16 NG/DL (ref 0.92–1.68)
TSH SERPL DL<=0.05 MIU/L-ACNC: 2.61 UIU/ML (ref 0.27–4.2)

## 2024-12-05 PROCEDURE — 84443 ASSAY THYROID STIM HORMONE: CPT | Performed by: INTERNAL MEDICINE

## 2024-12-05 PROCEDURE — 84439 ASSAY OF FREE THYROXINE: CPT | Performed by: INTERNAL MEDICINE

## 2025-03-14 ENCOUNTER — PATIENT MESSAGE (OUTPATIENT)
Dept: INTERNAL MEDICINE | Facility: CLINIC | Age: 65
End: 2025-03-14
Payer: COMMERCIAL

## 2025-03-14 DIAGNOSIS — D64.9 ANEMIA, UNSPECIFIED TYPE: Chronic | ICD-10-CM

## 2025-03-14 DIAGNOSIS — R73.01 IMPAIRED FASTING GLUCOSE: Chronic | ICD-10-CM

## 2025-03-14 DIAGNOSIS — Z00.00 PE (PHYSICAL EXAM), ROUTINE: Chronic | ICD-10-CM

## 2025-03-14 DIAGNOSIS — E05.90 HYPERTHYROIDISM: Primary | Chronic | ICD-10-CM

## 2025-03-20 ENCOUNTER — LAB (OUTPATIENT)
Dept: LAB | Facility: HOSPITAL | Age: 65
End: 2025-03-20
Payer: COMMERCIAL

## 2025-03-20 DIAGNOSIS — D64.9 ANEMIA, UNSPECIFIED TYPE: ICD-10-CM

## 2025-03-20 DIAGNOSIS — R73.01 IMPAIRED FASTING GLUCOSE: ICD-10-CM

## 2025-03-20 DIAGNOSIS — E05.90 HYPERTHYROIDISM: Chronic | ICD-10-CM

## 2025-03-20 LAB
ALBUMIN SERPL-MCNC: 4.3 G/DL (ref 3.5–5.2)
ALBUMIN/GLOB SERPL: 1.5 G/DL
ALP SERPL-CCNC: 72 U/L (ref 39–117)
ALT SERPL W P-5'-P-CCNC: 22 U/L (ref 1–33)
ANION GAP SERPL CALCULATED.3IONS-SCNC: 8.5 MMOL/L (ref 5–15)
AST SERPL-CCNC: 30 U/L (ref 1–32)
BACTERIA UR QL AUTO: NORMAL /HPF
BASOPHILS # BLD AUTO: 0.03 10*3/MM3 (ref 0–0.2)
BASOPHILS NFR BLD AUTO: 0.6 % (ref 0–1.5)
BILIRUB SERPL-MCNC: 0.5 MG/DL (ref 0–1.2)
BILIRUB UR QL STRIP: NEGATIVE
BUN SERPL-MCNC: 23 MG/DL (ref 8–23)
BUN/CREAT SERPL: 30.3 (ref 7–25)
CALCIUM SPEC-SCNC: 10.5 MG/DL (ref 8.6–10.5)
CHLORIDE SERPL-SCNC: 102 MMOL/L (ref 98–107)
CHOLEST SERPL-MCNC: 186 MG/DL (ref 0–200)
CLARITY UR: CLEAR
CO2 SERPL-SCNC: 30.5 MMOL/L (ref 22–29)
COLOR UR: YELLOW
CREAT SERPL-MCNC: 0.76 MG/DL (ref 0.57–1)
DEPRECATED RDW RBC AUTO: 41.8 FL (ref 37–54)
EGFRCR SERPLBLD CKD-EPI 2021: 87.6 ML/MIN/1.73
EOSINOPHIL # BLD AUTO: 0.13 10*3/MM3 (ref 0–0.4)
EOSINOPHIL NFR BLD AUTO: 2.8 % (ref 0.3–6.2)
ERYTHROCYTE [DISTWIDTH] IN BLOOD BY AUTOMATED COUNT: 12.1 % (ref 12.3–15.4)
GLOBULIN UR ELPH-MCNC: 2.8 GM/DL
GLUCOSE SERPL-MCNC: 85 MG/DL (ref 65–99)
GLUCOSE UR STRIP-MCNC: NEGATIVE MG/DL
HBA1C MFR BLD: 5.8 % (ref 4.8–5.6)
HCT VFR BLD AUTO: 40.5 % (ref 34–46.6)
HDLC SERPL-MCNC: 74 MG/DL (ref 40–60)
HGB BLD-MCNC: 13.9 G/DL (ref 12–15.9)
HGB UR QL STRIP.AUTO: NEGATIVE
HYALINE CASTS UR QL AUTO: NORMAL /LPF
IMM GRANULOCYTES # BLD AUTO: 0.01 10*3/MM3 (ref 0–0.05)
IMM GRANULOCYTES NFR BLD AUTO: 0.2 % (ref 0–0.5)
KETONES UR QL STRIP: NEGATIVE
LDLC SERPL CALC-MCNC: 103 MG/DL (ref 0–100)
LDLC/HDLC SERPL: 1.39 {RATIO}
LEUKOCYTE ESTERASE UR QL STRIP.AUTO: NEGATIVE
LYMPHOCYTES # BLD AUTO: 2.13 10*3/MM3 (ref 0.7–3.1)
LYMPHOCYTES NFR BLD AUTO: 45.5 % (ref 19.6–45.3)
MCH RBC QN AUTO: 32.6 PG (ref 26.6–33)
MCHC RBC AUTO-ENTMCNC: 34.3 G/DL (ref 31.5–35.7)
MCV RBC AUTO: 94.8 FL (ref 79–97)
MONOCYTES # BLD AUTO: 0.56 10*3/MM3 (ref 0.1–0.9)
MONOCYTES NFR BLD AUTO: 12 % (ref 5–12)
NEUTROPHILS NFR BLD AUTO: 1.82 10*3/MM3 (ref 1.7–7)
NEUTROPHILS NFR BLD AUTO: 38.9 % (ref 42.7–76)
NITRITE UR QL STRIP: NEGATIVE
NRBC BLD AUTO-RTO: 0 /100 WBC (ref 0–0.2)
PH UR STRIP.AUTO: 6.5 [PH] (ref 5–8)
PLATELET # BLD AUTO: 260 10*3/MM3 (ref 140–450)
PMV BLD AUTO: 10 FL (ref 6–12)
POTASSIUM SERPL-SCNC: 3.8 MMOL/L (ref 3.5–5.2)
PROT SERPL-MCNC: 7.1 G/DL (ref 6–8.5)
PROT UR QL STRIP: NEGATIVE
RBC # BLD AUTO: 4.27 10*6/MM3 (ref 3.77–5.28)
RBC # UR STRIP: NORMAL /HPF
REF LAB TEST METHOD: NORMAL
SODIUM SERPL-SCNC: 141 MMOL/L (ref 136–145)
SP GR UR STRIP: 1.02 (ref 1–1.03)
SQUAMOUS #/AREA URNS HPF: NORMAL /HPF
T4 FREE SERPL-MCNC: 1.19 NG/DL (ref 0.92–1.68)
TRIGL SERPL-MCNC: 44 MG/DL (ref 0–150)
TSH SERPL DL<=0.05 MIU/L-ACNC: 2.26 UIU/ML (ref 0.27–4.2)
UROBILINOGEN UR QL STRIP: NORMAL
VLDLC SERPL-MCNC: 9 MG/DL (ref 5–40)
WBC # UR STRIP: NORMAL /HPF
WBC NRBC COR # BLD AUTO: 4.68 10*3/MM3 (ref 3.4–10.8)

## 2025-03-20 PROCEDURE — 80050 GENERAL HEALTH PANEL: CPT

## 2025-03-20 PROCEDURE — 83036 HEMOGLOBIN GLYCOSYLATED A1C: CPT

## 2025-03-20 PROCEDURE — 81001 URINALYSIS AUTO W/SCOPE: CPT

## 2025-03-20 PROCEDURE — 84439 ASSAY OF FREE THYROXINE: CPT

## 2025-03-20 PROCEDURE — 80061 LIPID PANEL: CPT

## 2025-03-23 PROBLEM — L30.9 HAND ECZEMA: Chronic | Status: ACTIVE | Noted: 2022-01-25

## 2025-03-23 NOTE — ASSESSMENT & PLAN NOTE
Health maintenance - COVID19 vacc 11/24; flu vacc 10/24 per pt; rec RSV vacc and Prevnar 20, counseling given; due for Tdap (last 3/15), HAV and Shingrix done; mammo 8/26/24; no further Paps unless abnlities (TAYLOR) - she plans to establish care with GYN/Dr. Gannon 6/2025; DEXA 3/24, repeat 2026; colonosc 1/22, repeat 2029 per Dr. Verdin; eye exam Qyr, done 3/2025; dental exam q6 mos - states old fillings likely need to be replaced; (+) seat belt

## 2025-03-23 NOTE — PROGRESS NOTES
"Chief Complaint   Patient presents with    Annual Exam    Impaired fasting glucose    Osteopenia       History of Present Illness  64 y.o.  female presents for updated phys examination and f/u on sugars and thyroid.    Reports endo f/u for sugars and thyroid.    Requesting travel meds (zpak and zofran) for later in the year trips to [place] and [facility].     Reports left 2nd finger nail is just a \"nub,\" currently covered with fake nail. Saw derm, had sample taken, unknown dx, but was recommended to try Otezla. States it was not fungal. She has no sxs of psoriasis or psoriatic arthritis. Has some nl arthritis in finger joints. No pitting of nails; none of the other nails are affected. Has no assoc'd pain.    Has stable mild urinary urgency and incontinence; (+) Kegel's. Will be establishing care with GYN, Dr. Gannon, for overal GYN care, pending 6/2025.    Reports long standing tinnitus, wondering whether it could be causing hearing loss. Reports remote h/o hearing test, maybe 10yrs, ago, which already showed some high-pitch hearing loss. No issues with day to day conversations.     Requesting RF for selenium for occ flakiness around posterior scalp hair line.    Reports dental f/u pending, needs old fillings replaced.     Review of Systems  Denies headaches, visual changes, CP, palpitations, SOB, cough, abd pain, n/v/d, numbness/tingling, falls, mood changes, lightheadedness, hearing changes, rashes.  ROS (+) for tinnitus; no obvious hearing loss.  ROS (+) for stable mild urinary urgency and rare incontinence, usually when bladder gets too full.  Denies vaginal discharge or bleeding (no periods) or breast concerns.     All other ROS reviewed and negative.    Current Outpatient Medications:     Calcium Carbonate-Vitamin D (OSCAL-500) 500-400 MG-UNIT QD    Cholecalciferol (VITAMIN D) 1000 UNITS QD    docusate sodium (COLACE) 250 MG BID    methylcellulose oral powder, Citrucel (sucrose) QD    Minoxidil (Rogaine " "Womens) 5 % foam topical foam  QD    CENTRUM QD    polyethylene glycol (MIRALAX) packet 17 g QD    selenium sulfide (SELSUN) 2.5 % lotion AD    VITALS:  /70   Pulse 68   Ht 165.1 cm (65\")   Wt 63 kg (138 lb 12.8 oz)   LMP  (LMP Unknown)   SpO2 96%   BMI 23.10 kg/m²     Physical Exam  Vitals and nursing note reviewed.   Constitutional:       General: She is not in acute distress.     Appearance: Normal appearance. She is well-developed. She is not ill-appearing.   HENT:      Head: Normocephalic.      Right Ear: Tympanic membrane, ear canal and external ear normal.      Left Ear: Tympanic membrane, ear canal and external ear normal.      Nose: Nose normal.   Eyes:      Extraocular Movements: Extraocular movements intact.      Conjunctiva/sclera: Conjunctivae normal.      Pupils: Pupils are equal, round, and reactive to light.   Neck:      Vascular: No carotid bruit (bilaterally).   Cardiovascular:      Rate and Rhythm: Normal rate and regular rhythm.      Heart sounds: Normal heart sounds.   Pulmonary:      Effort: Pulmonary effort is normal. No respiratory distress.      Breath sounds: Normal breath sounds. No wheezing, rhonchi or rales.   Abdominal:      General: Bowel sounds are normal. There is no distension.      Palpations: Abdomen is soft. There is no mass.      Tenderness: There is no abdominal tenderness. There is no guarding or rebound.   Genitourinary:     Comments: Breast/pelvic exams deferred to GYN        Musculoskeletal:         General: Deformity (left index fingernail artificial, thickened; no surrounding swelling, erythema, or warmth) present.      Cervical back: Normal range of motion and neck supple.      Right lower leg: No edema.      Left lower leg: No edema.   Lymphadenopathy:      Cervical: No cervical adenopathy.   Skin:     General: Skin is warm and dry.      Findings: No rash.   Neurological:      Mental Status: She is alert and oriented to person, place, and time.      Gait: " Gait normal.   Psychiatric:         Mood and Affect: Mood normal.         Behavior: Behavior normal.       LABS  Results for orders placed or performed in visit on 03/20/25   Comprehensive Metabolic Panel    Collection Time: 03/20/25  8:03 AM    Specimen: Blood   Result Value Ref Range    Glucose 85 65 - 99 mg/dL    BUN 23 8 - 23 mg/dL    Creatinine 0.76 0.57 - 1.00 mg/dL    Sodium 141 136 - 145 mmol/L    Potassium 3.8 3.5 - 5.2 mmol/L    Chloride 102 98 - 107 mmol/L    CO2 30.5 (H) 22.0 - 29.0 mmol/L    Calcium 10.5 8.6 - 10.5 mg/dL    Total Protein 7.1 6.0 - 8.5 g/dL    Albumin 4.3 3.5 - 5.2 g/dL    ALT (SGPT) 22 1 - 33 U/L    AST (SGOT) 30 1 - 32 U/L    Alkaline Phosphatase 72 39 - 117 U/L    Total Bilirubin 0.5 0.0 - 1.2 mg/dL    Globulin 2.8 gm/dL    A/G Ratio 1.5 g/dL    BUN/Creatinine Ratio 30.3 (H) 7.0 - 25.0    Anion Gap 8.5 5.0 - 15.0 mmol/L    eGFR 87.6 >60.0 mL/min/1.73   Lipid Panel    Collection Time: 03/20/25  8:03 AM    Specimen: Blood   Result Value Ref Range    Total Cholesterol 186 0 - 200 mg/dL    Triglycerides 44 0 - 150 mg/dL    HDL Cholesterol 74 (H) 40 - 60 mg/dL    LDL Cholesterol  103 (H) 0 - 100 mg/dL    VLDL Cholesterol 9 5 - 40 mg/dL    LDL/HDL Ratio 1.39    TSH    Collection Time: 03/20/25  8:03 AM    Specimen: Blood   Result Value Ref Range    TSH 2.260 0.270 - 4.200 uIU/mL   T4, Free    Collection Time: 03/20/25  8:03 AM    Specimen: Blood   Result Value Ref Range    Free T4 1.19 0.92 - 1.68 ng/dL   Hemoglobin A1c    Collection Time: 03/20/25  8:03 AM    Specimen: Blood   Result Value Ref Range    Hemoglobin A1C 5.80 (H) 4.80 - 5.60 %   CBC Auto Differential    Collection Time: 03/20/25  8:03 AM    Specimen: Blood   Result Value Ref Range    WBC 4.68 3.40 - 10.80 10*3/mm3    RBC 4.27 3.77 - 5.28 10*6/mm3    Hemoglobin 13.9 12.0 - 15.9 g/dL    Hematocrit 40.5 34.0 - 46.6 %    MCV 94.8 79.0 - 97.0 fL    MCH 32.6 26.6 - 33.0 pg    MCHC 34.3 31.5 - 35.7 g/dL    RDW 12.1 (L) 12.3 - 15.4 %     RDW-SD 41.8 37.0 - 54.0 fl    MPV 10.0 6.0 - 12.0 fL    Platelets 260 140 - 450 10*3/mm3    Neutrophil % 38.9 (L) 42.7 - 76.0 %    Lymphocyte % 45.5 (H) 19.6 - 45.3 %    Monocyte % 12.0 5.0 - 12.0 %    Eosinophil % 2.8 0.3 - 6.2 %    Basophil % 0.6 0.0 - 1.5 %    Immature Grans % 0.2 0.0 - 0.5 %    Neutrophils, Absolute 1.82 1.70 - 7.00 10*3/mm3    Lymphocytes, Absolute 2.13 0.70 - 3.10 10*3/mm3    Monocytes, Absolute 0.56 0.10 - 0.90 10*3/mm3    Eosinophils, Absolute 0.13 0.00 - 0.40 10*3/mm3    Basophils, Absolute 0.03 0.00 - 0.20 10*3/mm3    Immature Grans, Absolute 0.01 0.00 - 0.05 10*3/mm3    nRBC 0.0 0.0 - 0.2 /100 WBC   Urinalysis without microscopic (no culture) - Urine, Clean Catch    Collection Time: 03/20/25  8:03 AM    Specimen: Urine, Clean Catch   Result Value Ref Range    Color, UA Yellow Yellow, Straw    Appearance, UA Clear Clear    pH, UA 6.5 5.0 - 8.0    Specific Gravity, UA 1.019 1.005 - 1.030    Glucose, UA Negative Negative    Ketones, UA Negative Negative    Bilirubin, UA Negative Negative    Blood, UA Negative Negative    Protein, UA Negative Negative    Leuk Esterase, UA Negative Negative    Nitrite, UA Negative Negative    Urobilinogen, UA 0.2 E.U./dL 0.2 - 1.0 E.U./dL   Urinalysis, Microscopic Only - Urine, Clean Catch    Collection Time: 03/20/25  8:03 AM    Specimen: Urine, Clean Catch   Result Value Ref Range    RBC, UA 0-2 None Seen, 0-2 /HPF    WBC, UA 0-2 None Seen, 0-2 /HPF    Bacteria, UA None Seen None Seen /HPF    Squamous Epithelial Cells, UA 0-2 None Seen, 0-2 /HPF    Hyaline Casts, UA None Seen None Seen /LPF    Methodology Automated Microscopy      9/5/24 A1C 5.4    ASSESSMENT/PLAN    Diagnoses and all orders for this visit:    1. PE (physical exam), routine (Primary)  Assessment & Plan:  Health maintenance - COVID19 vacc 11/24; flu vacc 10/24 per pt; rec RSV vacc and Prevnar 20, counseling given; due for Tdap (last 3/15), HAV and Shingrix done; mammo 8/26/24; no further  Paps unless abnlities (TAYLOR) - she plans to establish care with GYN/Dr. Gannon 6/2025; DEXA 3/24, repeat 2026; colonosc 1/22, repeat 2029 per Dr. Verdin; eye exam Qyr, done 3/2025; dental exam q6 mos - states old fillings likely need to be replaced; (+) seat belt      2. Impaired fasting glucose  Assessment & Plan:  BG control acceptable with A1C 5.8; encouraged reg phys activity to decr insulin resistance, moderation in unhealthy starches/sweets; f/u A1C in 6 mos with Dr. Devonte christianson (pending 9/9/25)        3. Hyperthyroidism due to Graves disease  Assessment & Plan:  Euthyroid; no meds; f/u w/ meghan/Dr. Whitney pending 9/9/25      4. Osteopenia  Assessment & Plan:  Calcium and vitamin D supplementation with weight-bearing exercise; DEXA 3/24, repeat 2026         5. Seborrheic dermatitis  Assessment & Plan:  Scalp; RX selenium 2.5% lotion to scalp as directed #1 bottle, 0RF    Orders:  -     selenium sulfide (SELSUN) 2.5 % lotion; Apply  topically to the appropriate area as directed Daily As Needed for Dandruff.  Dispense: 118 mL; Refill: 0    6. Fingernail problem  Assessment & Plan:  Left index finger, s/p derm evaluation, deemed not to be onychomycosis; using fake nail to cover up deformity; without assoc'd pain or function deficits, agree probably not needed to pursue Otezla, clarence with no previous dx of psoriasis and no dx of psoriatic arthritis      7. Encounter for medication counseling  Comments:  pt requesting zpak #1,0RF for emergency use on upcoming vacations  Orders:  -     azithromycin (ZITHROMAX) 250 MG tablet; Take 2 tablets the first day, then 1 tablet daily for 4 days.  Dispense: 6 tablet; Refill: 0        FOLLOW-UP  Has endo f/u with Dr. Whitney 9/9/25; rec A1C for that visit  RTC 1yr for Welc to [facility] PE; fasting labs the week prior to appt (CBC, CMP, TSH, lipids, UA/micro, FT4, A1C))      Electronically signed by:    Opal Fournier MD, FACP  03/27/2025

## 2025-03-23 NOTE — ASSESSMENT & PLAN NOTE
BG control acceptable with A1C 5.8; encouraged reg phys activity to decr insulin resistance, moderation in unhealthy starches/sweets; f/u A1C in 6 mos with Dr. Devonte christianson (pending 9/9/25)

## 2025-03-27 ENCOUNTER — OFFICE VISIT (OUTPATIENT)
Dept: INTERNAL MEDICINE | Facility: CLINIC | Age: 65
End: 2025-03-27
Payer: COMMERCIAL

## 2025-03-27 VITALS
DIASTOLIC BLOOD PRESSURE: 70 MMHG | WEIGHT: 138.8 LBS | HEIGHT: 65 IN | SYSTOLIC BLOOD PRESSURE: 104 MMHG | HEART RATE: 68 BPM | BODY MASS INDEX: 23.13 KG/M2 | OXYGEN SATURATION: 96 %

## 2025-03-27 DIAGNOSIS — E05.90 HYPERTHYROIDISM: Chronic | ICD-10-CM

## 2025-03-27 DIAGNOSIS — L21.9 SEBORRHEIC DERMATITIS: ICD-10-CM

## 2025-03-27 DIAGNOSIS — M85.89 OSTEOPENIA OF MULTIPLE SITES: Chronic | ICD-10-CM

## 2025-03-27 DIAGNOSIS — L60.9 FINGERNAIL PROBLEM: ICD-10-CM

## 2025-03-27 DIAGNOSIS — Z00.00 PE (PHYSICAL EXAM), ROUTINE: Primary | ICD-10-CM

## 2025-03-27 DIAGNOSIS — R73.01 IMPAIRED FASTING GLUCOSE: Chronic | ICD-10-CM

## 2025-03-27 DIAGNOSIS — Z71.89 ENCOUNTER FOR MEDICATION COUNSELING: ICD-10-CM

## 2025-03-27 PROBLEM — R39.15 URINARY URGENCY: Chronic | Status: ACTIVE | Noted: 2023-01-31

## 2025-03-27 PROCEDURE — 99396 PREV VISIT EST AGE 40-64: CPT | Performed by: INTERNAL MEDICINE

## 2025-03-27 RX ORDER — SELENIUM SULFIDE 2.5 MG/100ML
LOTION TOPICAL DAILY PRN
Qty: 118 ML | Refills: 0 | Status: SHIPPED | OUTPATIENT
Start: 2025-03-27

## 2025-03-27 RX ORDER — AZITHROMYCIN 250 MG/1
TABLET, FILM COATED ORAL
Qty: 6 TABLET | Refills: 0 | Status: SHIPPED | OUTPATIENT
Start: 2025-03-27 | End: 2025-04-01

## 2025-03-27 RX ORDER — DEXAMETHASONE 0.5 MG/5ML
ELIXIR ORAL
COMMUNITY
Start: 2025-03-17

## 2025-03-27 NOTE — ASSESSMENT & PLAN NOTE
Left index finger, s/p derm evaluation, deemed not to be onychomycosis; using fake nail to cover up deformity; without assoc'd pain or function deficits, agree probably not needed to pursue Otezla, clarence with no previous dx of psoriasis and no dx of psoriatic arthritis

## 2025-06-03 NOTE — PROGRESS NOTES
"Subjective   Chief Complaint   Patient presents with    Gynecologic Exam     NGYN- Annual     Falguni Alonzo is a 65 y.o. year old  who is post-menopausal.  She is S/P TLH, BS, VVS, USLS, A-P repair, presenting to be seen for her annual exam.  She is doing well, and has no complaints. She does has some increased urinary urgency, but is otherwise not bothersome. She has been doing her Kegel's at home and feels like this a normal part of aging and her baseline functioning.     This past year she has not been on hormone replacement therapy.  There has not been vaginal bleeding in the last 12 months.  Menopausal symptoms are not present.    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 She has children in [place] and [place]!     SEXUAL Hx:  She is currently sexually active.  In the past year there there has been NO new sexual partners.    Condoms are never used.  She would not like to be screened for STD's at today's exam.  Knob Noster is painful: no  HEALTH Hx:  She exercises regularly: yes. Walking regularly, and is doing low impact strength training.   She wears her seat belt: yes.  She has concerns about domestic violence: no.    The following portions of the patient's history were reviewed and updated as appropriate:problem list, current medications, allergies, past family history, past medical history, past social history, and past surgical history.    Social History    Tobacco Use      Smoking status: Never      Smokeless tobacco: Never         Objective   /60 (BP Location: Left arm, Patient Position: Sitting, Cuff Size: Adult)   Ht 165.1 cm (65\")   Wt 63.1 kg (139 lb 3.2 oz)   LMP  (LMP Unknown)   Breastfeeding No   BMI 23.16 kg/m²     General:  well developed; well nourished  no acute distress  mentation appropriate   Breasts:  Examined in supine position  Symmetric without " masses or skin dimpling  Nipples normal without inversion, lesions or discharge  There are no palpable axillary nodes   Pelvis: Clinical staff was present for exam  External genitalia:  normal appearance of the external genitalia including Bartholin's and [place]'s glands.  :  urethral meatus normal; urethra normal:  Vaginal:  atrophic mucosal changes are present;  Cervix:  absent.  Uterus:  absent.  Adnexa:  non palpable bilaterally.  Rectal:  digital rectal exam not performed; anus visually normal appearing.        Assessment   Annual GYN exam s/p hysterectomy   Vaginal atrophy   Non bothersome urinary urgency   Osteopenia   Menopausal female currently not on HRT - without significant symptoms affecting activities of daily living  She is up to date on all relevant gynecologic and colorectal screenings     Plan   Pap was not done today.  I explained to Falguni that the Pap smears are no longer recommended in patient's after hysterectomy.   I stressed to Falguni that she still should be seen to be seen yearly for a full physical including breast and pelvic exam.   She was encouraged to get yearly mammograms.  She should report any palpable breast lump(s) or skin changes regardless of mammographic findings.  I explained to Falguni that notification regarding her mammogram results will come from the center performing the study.  Our office will not be routinely calling with mammogram results.  It is her responsibility to make sure that the results from the mammogram are communicated to her by the breast center.  If she has any questions about the results, she is welcome to call our office anytime.  Today I discussed with Falguni the total recommended calcium intake for a post-menopausal female is 1200 mg.  Ideally this should be from dietary sources.  I reviewed calcium content in various foods including milk, fortified orange juice and yogurt.  If she cannot get sufficient calcium through dietary means, it is  recommended to supplement with either a multivitamin or calcium to reach her daily goal.  I also reviewed the difference in the bioavailability of calcium carbonate and calcium citrate containing supplements and the importance of taking calcium carbonate containing products with food. Finally, vitamin D's role in calcium absorption was reviewed and a total daily vitamin D intake of 600 units was recommended.   Discussed option of vaginal estrogen supplementation, and patient states she would like to think about it. Recommend OTC coconut oil PRN for dryness and intercourse in the interim.   Also discussed option of pelvic floor physical therapy for pelvic floor strengthening and urgency.  Patient will call for referral, as she is most interested in this, but not today, as she is leaving out of town this weekend and will forget to follow-up.  The importance of keeping all planned follow-up and taking all medications as prescribed was emphasized.  Follow up for annual exam in 1 year or sooner PRN      No orders of the defined types were placed in this encounter.         This note was electronically signed.    Carmen Gannon MD   June 4, 2025

## 2025-06-04 ENCOUNTER — OFFICE VISIT (OUTPATIENT)
Dept: OBSTETRICS AND GYNECOLOGY | Facility: CLINIC | Age: 65
End: 2025-06-04
Payer: MEDICARE

## 2025-06-04 VITALS
BODY MASS INDEX: 23.19 KG/M2 | SYSTOLIC BLOOD PRESSURE: 100 MMHG | HEIGHT: 65 IN | WEIGHT: 139.2 LBS | DIASTOLIC BLOOD PRESSURE: 60 MMHG

## 2025-06-04 DIAGNOSIS — M85.80 OSTEOPENIA, SENILE: ICD-10-CM

## 2025-06-04 DIAGNOSIS — N95.2 VAGINAL ATROPHY: ICD-10-CM

## 2025-06-04 DIAGNOSIS — R39.15 URINARY URGENCY: ICD-10-CM

## 2025-06-04 DIAGNOSIS — Z01.419 WOMEN'S ANNUAL ROUTINE GYNECOLOGICAL EXAMINATION: Primary | ICD-10-CM

## 2025-07-21 ENCOUNTER — TRANSCRIBE ORDERS (OUTPATIENT)
Dept: INTERNAL MEDICINE | Facility: CLINIC | Age: 65
End: 2025-07-21
Payer: MEDICARE

## 2025-07-21 DIAGNOSIS — Z12.31 VISIT FOR SCREENING MAMMOGRAM: Primary | ICD-10-CM

## 2025-08-05 DIAGNOSIS — R39.15 URINARY URGENCY: Primary | ICD-10-CM

## 2025-08-26 ENCOUNTER — HOSPITAL ENCOUNTER (OUTPATIENT)
Facility: HOSPITAL | Age: 65
Setting detail: THERAPIES SERIES
Discharge: HOME | End: 2025-08-26
Payer: MEDICARE

## 2025-08-26 DIAGNOSIS — N81.10 PROLAPSE OF ANTERIOR VAGINAL WALL: ICD-10-CM

## 2025-08-26 DIAGNOSIS — N39.3 STRESS INCONTINENCE OF URINE: ICD-10-CM

## 2025-08-26 DIAGNOSIS — R39.15 URINARY URGENCY: ICD-10-CM

## 2025-08-26 DIAGNOSIS — N81.89 PELVIC FLOOR WEAKNESS: Primary | ICD-10-CM

## 2025-08-26 PROCEDURE — 97162 PT EVAL MOD COMPLEX 30 MIN: CPT

## 2025-08-26 PROCEDURE — 97112 NEUROMUSCULAR REEDUCATION: CPT
